# Patient Record
Sex: MALE | Race: ASIAN | NOT HISPANIC OR LATINO
[De-identification: names, ages, dates, MRNs, and addresses within clinical notes are randomized per-mention and may not be internally consistent; named-entity substitution may affect disease eponyms.]

---

## 2018-02-09 ENCOUNTER — APPOINTMENT (OUTPATIENT)
Dept: RADIOLOGY | Facility: HOSPITAL | Age: 68
End: 2018-02-09

## 2023-03-27 ENCOUNTER — INPATIENT (INPATIENT)
Facility: HOSPITAL | Age: 73
LOS: 3 days | Discharge: HOME | DRG: 372 | End: 2023-03-31
Attending: INTERNAL MEDICINE | Admitting: INTERNAL MEDICINE
Payer: MEDICARE

## 2023-03-27 VITALS
RESPIRATION RATE: 16 BRPM | OXYGEN SATURATION: 97 % | SYSTOLIC BLOOD PRESSURE: 122 MMHG | TEMPERATURE: 99 F | HEART RATE: 123 BPM | DIASTOLIC BLOOD PRESSURE: 86 MMHG

## 2023-03-27 DIAGNOSIS — I48.91 UNSPECIFIED ATRIAL FIBRILLATION: ICD-10-CM

## 2023-03-27 LAB
ALBUMIN SERPL ELPH-MCNC: 3.2 G/DL — LOW (ref 3.3–5)
ALP SERPL-CCNC: 73 U/L — SIGNIFICANT CHANGE UP (ref 40–120)
ALT FLD-CCNC: 46 U/L — HIGH (ref 10–45)
ANION GAP SERPL CALC-SCNC: 10 MMOL/L — SIGNIFICANT CHANGE UP (ref 5–17)
APTT BLD: 33.9 SEC — SIGNIFICANT CHANGE UP (ref 27.5–35.5)
AST SERPL-CCNC: 52 U/L — HIGH (ref 10–40)
BASOPHILS # BLD AUTO: 0.05 K/UL — SIGNIFICANT CHANGE UP (ref 0–0.2)
BASOPHILS NFR BLD AUTO: 0.8 % — SIGNIFICANT CHANGE UP (ref 0–2)
BILIRUB SERPL-MCNC: 1.4 MG/DL — HIGH (ref 0.2–1.2)
BUN SERPL-MCNC: 14 MG/DL — SIGNIFICANT CHANGE UP (ref 7–23)
CALCIUM SERPL-MCNC: 8.6 MG/DL — SIGNIFICANT CHANGE UP (ref 8.4–10.5)
CHLORIDE SERPL-SCNC: 101 MMOL/L — SIGNIFICANT CHANGE UP (ref 96–108)
CO2 SERPL-SCNC: 26 MMOL/L — SIGNIFICANT CHANGE UP (ref 22–31)
CREAT SERPL-MCNC: 0.96 MG/DL — SIGNIFICANT CHANGE UP (ref 0.5–1.3)
EGFR: 83 ML/MIN/1.73M2 — SIGNIFICANT CHANGE UP
EOSINOPHIL # BLD AUTO: 0.04 K/UL — SIGNIFICANT CHANGE UP (ref 0–0.5)
EOSINOPHIL NFR BLD AUTO: 0.6 % — SIGNIFICANT CHANGE UP (ref 0–6)
GLUCOSE SERPL-MCNC: 145 MG/DL — HIGH (ref 70–99)
HCT VFR BLD CALC: 41.9 % — SIGNIFICANT CHANGE UP (ref 39–50)
HGB BLD-MCNC: 13.8 G/DL — SIGNIFICANT CHANGE UP (ref 13–17)
IMM GRANULOCYTES NFR BLD AUTO: 0.3 % — SIGNIFICANT CHANGE UP (ref 0–0.9)
INR BLD: 1.67 RATIO — HIGH (ref 0.88–1.16)
LYMPHOCYTES # BLD AUTO: 1.54 K/UL — SIGNIFICANT CHANGE UP (ref 1–3.3)
LYMPHOCYTES # BLD AUTO: 23.7 % — SIGNIFICANT CHANGE UP (ref 13–44)
MCHC RBC-ENTMCNC: 31.9 PG — SIGNIFICANT CHANGE UP (ref 27–34)
MCHC RBC-ENTMCNC: 32.9 GM/DL — SIGNIFICANT CHANGE UP (ref 32–36)
MCV RBC AUTO: 96.8 FL — SIGNIFICANT CHANGE UP (ref 80–100)
MONOCYTES # BLD AUTO: 0.63 K/UL — SIGNIFICANT CHANGE UP (ref 0–0.9)
MONOCYTES NFR BLD AUTO: 9.7 % — SIGNIFICANT CHANGE UP (ref 2–14)
NEUTROPHILS # BLD AUTO: 4.21 K/UL — SIGNIFICANT CHANGE UP (ref 1.8–7.4)
NEUTROPHILS NFR BLD AUTO: 64.9 % — SIGNIFICANT CHANGE UP (ref 43–77)
NRBC # BLD: 0 /100 WBCS — SIGNIFICANT CHANGE UP (ref 0–0)
PLATELET # BLD AUTO: 245 K/UL — SIGNIFICANT CHANGE UP (ref 150–400)
POTASSIUM SERPL-MCNC: 4.2 MMOL/L — SIGNIFICANT CHANGE UP (ref 3.5–5.3)
POTASSIUM SERPL-SCNC: 4.2 MMOL/L — SIGNIFICANT CHANGE UP (ref 3.5–5.3)
PROT SERPL-MCNC: 6.5 G/DL — SIGNIFICANT CHANGE UP (ref 6–8.3)
PROTHROM AB SERPL-ACNC: 19.5 SEC — HIGH (ref 10.5–13.4)
RAPID RVP RESULT: SIGNIFICANT CHANGE UP
RBC # BLD: 4.33 M/UL — SIGNIFICANT CHANGE UP (ref 4.2–5.8)
RBC # BLD: 4.33 M/UL — SIGNIFICANT CHANGE UP (ref 4.2–5.8)
RBC # FLD: 12 % — SIGNIFICANT CHANGE UP (ref 10.3–14.5)
RETICS #: 46.8 K/UL — SIGNIFICANT CHANGE UP (ref 25–125)
RETICS/RBC NFR: 1.1 % — SIGNIFICANT CHANGE UP (ref 0.5–2.5)
SARS-COV-2 RNA SPEC QL NAA+PROBE: SIGNIFICANT CHANGE UP
SODIUM SERPL-SCNC: 137 MMOL/L — SIGNIFICANT CHANGE UP (ref 135–145)
WBC # BLD: 6.49 K/UL — SIGNIFICANT CHANGE UP (ref 3.8–10.5)
WBC # FLD AUTO: 6.49 K/UL — SIGNIFICANT CHANGE UP (ref 3.8–10.5)

## 2023-03-27 PROCEDURE — 71046 X-RAY EXAM CHEST 2 VIEWS: CPT | Mod: 26

## 2023-03-27 PROCEDURE — 99285 EMERGENCY DEPT VISIT HI MDM: CPT

## 2023-03-27 PROCEDURE — 74177 CT ABD & PELVIS W/CONTRAST: CPT | Mod: 26

## 2023-03-27 RX ORDER — SOTALOL HCL 120 MG
80 TABLET ORAL EVERY 24 HOURS
Refills: 0 | Status: DISCONTINUED | OUTPATIENT
Start: 2023-03-27 | End: 2023-03-30

## 2023-03-27 NOTE — ED ADULT NURSE NOTE - PATIENT/CAREGIVER ACCEPTED INTERPRETER SERVICES
yes Nsaids Counseling: NSAID Counseling: I discussed with the patient that NSAIDs should be taken with food. Prolonged use of NSAIDs can result in the development of stomach ulcers.  Patient advised to stop taking NSAIDs if abdominal pain occurs.  The patient verbalized understanding of the proper use and possible adverse effects of NSAIDs.  All of the patient's questions and concerns were addressed.

## 2023-03-27 NOTE — ED ADULT NURSE NOTE - OBJECTIVE STATEMENT
71y/o male bib triage, c/o diarrhea x 1 mth, also blood in stool x 2 wks, pt sts bright red blood in stool x 2 days, negative Nausea and vomiting. abdomen soft x 4 quadrants. negative sob, chest pain.

## 2023-03-27 NOTE — ED ADULT NURSE NOTE - NSSEPSISNEWALTERMENTAL_ED_A_ED
No Detail Level: Zone Recommendation Preamble: The following recommendations were made during visit: Cerave SA renewing cream or Amlactin applied 1-2x daily

## 2023-03-27 NOTE — ED ADULT TRIAGE NOTE - WILL THE PATIENT ACCEPT THE PFIZER COVID-19 VACCINE IF ELIGIBLE AND IT IS AVAILABLE?
Message from call center,  Patient spoke with Julia Bedoya yesterday and needs to be seen today regarding swelling in knees and feet No

## 2023-03-27 NOTE — H&P ADULT - ASSESSMENT
72 male h/o cad s/p pci, here with bloody diarrhea    bloody diarrhea  pancolitis on CT  gi consulted  check stool pcr    cad s/p pci  hold asa/plavix in setting of gi bleed  cards consulted    Advanced care planning was discussed with patient and family.  Advanced care planning forms were reviewed and discussed as appropriate.  Differential diagnosis and plan of care discussed with patient after the evaluation.   Pain assessed and judicious use of narcotics when appropriate was discussed.  Importance of Fall prevention discussed.  Counseling on Smoking and Alcohol cessation was offered when appropriate.  Counseling on Diet, exercise, and medication compliance was done.       Approx 60 minutes spent. 72 male h/o cad s/p pci, here with bloody diarrhea    bloody diarrhea  pancolitis on CT  gi consulted  check stool pcr    cad s/p pci  hold asa/plavix in setting of gi bleed  cards consulted    afib  hold AC in setting of bleed  rate control with sotalol      Advanced care planning was discussed with patient and family.  Advanced care planning forms were reviewed and discussed as appropriate.  Differential diagnosis and plan of care discussed with patient after the evaluation.   Pain assessed and judicious use of narcotics when appropriate was discussed.  Importance of Fall prevention discussed.  Counseling on Smoking and Alcohol cessation was offered when appropriate.  Counseling on Diet, exercise, and medication compliance was done.       Approx 60 minutes spent. 72 male h/o cad s/p pci, here with bloody diarrhea    bloody diarrhea  pancolitis on CT  gi consulted  check stool pcr  active type and screen  serial cbc    cad s/p pci  hold asa/plavix in setting of gi bleed  cards consulted    afib  hold AC in setting of bleed  rate control with sotalol      Advanced care planning was discussed with patient and family.  Advanced care planning forms were reviewed and discussed as appropriate.  Differential diagnosis and plan of care discussed with patient after the evaluation.   Pain assessed and judicious use of narcotics when appropriate was discussed.  Importance of Fall prevention discussed.  Counseling on Smoking and Alcohol cessation was offered when appropriate.  Counseling on Diet, exercise, and medication compliance was done.       Approx 60 minutes spent.

## 2023-03-27 NOTE — H&P ADULT - NSHPPHYSICALEXAM_GEN_ALL_CORE
Vital Signs Last 24 Hrs  T(C): 36.8 (27 Mar 2023 16:46), Max: 37 (27 Mar 2023 12:53)  T(F): 98.2 (27 Mar 2023 16:46), Max: 98.6 (27 Mar 2023 12:53)  HR: 56 (27 Mar 2023 16:46) (56 - 141)  BP: 130/61 (27 Mar 2023 16:46) (100/64 - 130/61)  BP(mean): 72 (27 Mar 2023 14:16) (72 - 73)  RR: 16 (27 Mar 2023 16:46) (16 - 19)  SpO2: 100% (27 Mar 2023 16:46) (97% - 100%)    Parameters below as of 27 Mar 2023 14:16  Patient On (Oxygen Delivery Method): room air    PHYSICAL EXAM:  GENERAL: NAD, well-developed  HEAD:  Atraumatic, Normocephalic  EYES: EOMI, PERRLA, conjunctiva and sclera clear  NECK: Supple, No JVD  CHEST/LUNG: Clear to auscultation bilaterally; No wheeze  HEART: Regular rate and rhythm; No murmurs, rubs, or gallops  ABDOMEN: Soft, Nontender, Nondistended; Bowel sounds present  EXTREMITIES:  2+ Peripheral Pulses, No clubbing, cyanosis, or edema  PSYCH: AAOx3  NEUROLOGY: non-focal  SKIN: No rashes or lesions

## 2023-03-27 NOTE — H&P ADULT - HISTORY OF PRESENT ILLNESS
73 yo male h/o  cad s/p pci, here with c/o diarrhea x4 weeks, which has been bloody past 2 weeks.  no n/v  no chest pain  or sob     73 yo male h/o afib on xarelto,  cad s/p pci, here with c/o diarrhea x4 weeks, which has been bloody past 2 weeks.  no n/v  no chest pain  or sob

## 2023-03-28 DIAGNOSIS — I25.10 ATHEROSCLEROTIC HEART DISEASE OF NATIVE CORONARY ARTERY WITHOUT ANGINA PECTORIS: ICD-10-CM

## 2023-03-28 DIAGNOSIS — K92.2 GASTROINTESTINAL HEMORRHAGE, UNSPECIFIED: ICD-10-CM

## 2023-03-28 DIAGNOSIS — I48.91 UNSPECIFIED ATRIAL FIBRILLATION: ICD-10-CM

## 2023-03-28 LAB
ANION GAP SERPL CALC-SCNC: 10 MMOL/L — SIGNIFICANT CHANGE UP (ref 5–17)
BUN SERPL-MCNC: 12 MG/DL — SIGNIFICANT CHANGE UP (ref 7–23)
CALCIUM SERPL-MCNC: 8.3 MG/DL — LOW (ref 8.4–10.5)
CHLORIDE SERPL-SCNC: 103 MMOL/L — SIGNIFICANT CHANGE UP (ref 96–108)
CO2 SERPL-SCNC: 25 MMOL/L — SIGNIFICANT CHANGE UP (ref 22–31)
CREAT SERPL-MCNC: 0.96 MG/DL — SIGNIFICANT CHANGE UP (ref 0.5–1.3)
CRP SERPL-MCNC: 23 MG/L — HIGH (ref 0–4)
EGFR: 83 ML/MIN/1.73M2 — SIGNIFICANT CHANGE UP
ERYTHROCYTE [SEDIMENTATION RATE] IN BLOOD: 8 MM/HR — SIGNIFICANT CHANGE UP (ref 0–20)
GLUCOSE SERPL-MCNC: 102 MG/DL — HIGH (ref 70–99)
HCT VFR BLD CALC: 35.1 % — LOW (ref 39–50)
HCV AB S/CO SERPL IA: 0.09 S/CO — SIGNIFICANT CHANGE UP (ref 0–0.99)
HCV AB SERPL-IMP: SIGNIFICANT CHANGE UP
HGB BLD-MCNC: 11.8 G/DL — LOW (ref 13–17)
MCHC RBC-ENTMCNC: 32.6 PG — SIGNIFICANT CHANGE UP (ref 27–34)
MCHC RBC-ENTMCNC: 33.6 GM/DL — SIGNIFICANT CHANGE UP (ref 32–36)
MCV RBC AUTO: 97 FL — SIGNIFICANT CHANGE UP (ref 80–100)
NRBC # BLD: 0 /100 WBCS — SIGNIFICANT CHANGE UP (ref 0–0)
PLATELET # BLD AUTO: 191 K/UL — SIGNIFICANT CHANGE UP (ref 150–400)
POTASSIUM SERPL-MCNC: 3.9 MMOL/L — SIGNIFICANT CHANGE UP (ref 3.5–5.3)
POTASSIUM SERPL-SCNC: 3.9 MMOL/L — SIGNIFICANT CHANGE UP (ref 3.5–5.3)
RBC # BLD: 3.62 M/UL — LOW (ref 4.2–5.8)
RBC # FLD: 12.1 % — SIGNIFICANT CHANGE UP (ref 10.3–14.5)
SODIUM SERPL-SCNC: 138 MMOL/L — SIGNIFICANT CHANGE UP (ref 135–145)
WBC # BLD: 5.12 K/UL — SIGNIFICANT CHANGE UP (ref 3.8–10.5)
WBC # FLD AUTO: 5.12 K/UL — SIGNIFICANT CHANGE UP (ref 3.8–10.5)

## 2023-03-28 RX ORDER — CIPROFLOXACIN LACTATE 400MG/40ML
VIAL (ML) INTRAVENOUS
Refills: 0 | Status: DISCONTINUED | OUTPATIENT
Start: 2023-03-28 | End: 2023-03-28

## 2023-03-28 RX ORDER — METRONIDAZOLE 500 MG
TABLET ORAL
Refills: 0 | Status: DISCONTINUED | OUTPATIENT
Start: 2023-03-28 | End: 2023-03-30

## 2023-03-28 RX ORDER — METRONIDAZOLE 500 MG
500 TABLET ORAL EVERY 8 HOURS
Refills: 0 | Status: DISCONTINUED | OUTPATIENT
Start: 2023-03-28 | End: 2023-03-30

## 2023-03-28 RX ORDER — METRONIDAZOLE 500 MG
500 TABLET ORAL ONCE
Refills: 0 | Status: COMPLETED | OUTPATIENT
Start: 2023-03-28 | End: 2023-03-28

## 2023-03-28 RX ADMIN — Medication 100 MILLIGRAM(S): at 12:45

## 2023-03-28 RX ADMIN — Medication 100 MILLIGRAM(S): at 22:11

## 2023-03-28 RX ADMIN — Medication 80 MILLIGRAM(S): at 06:01

## 2023-03-28 NOTE — CONSULT NOTE ADULT - ASSESSMENT
diarrhea   colitis    CT a/p with pancolitis  check GI PCR  start cipro/flagyl   CLD  advance diet as tolerated  will need colonoscopy in 6-8 weeks   d/w pt in ER    I reviewed the overnight course of events on the unit, re-confirming the patient history. I discussed the care with the patient and their family  The plan of care was discussed with the physician assistant and modifications were made to the notation where appropriate.   Differential diagnosis and plan of care discussed with patient after the evaluation  35 minutes spent on total encounter of which more than fifty percent of the encounter was spent counseling and/or coordinating care by the attending physician.  Advanced care planning was discussed with patient and family.  Advanced care planning forms were reviewed and discussed.  Risks, benefits and alternatives of gastroenterologic procedures were discussed in detail and all questions were answered.    diarrhea   colitis    CT a/p with pancolitis  check GI PCR  start cipro/flagyl   CLD  advance diet as tolerated  check ESR, CRP and fecal calprotectin   will need colonoscopy in 6-8 weeks   d/w pt in ER    I reviewed the overnight course of events on the unit, re-confirming the patient history. I discussed the care with the patient and their family  The plan of care was discussed with the physician assistant and modifications were made to the notation where appropriate.   Differential diagnosis and plan of care discussed with patient after the evaluation  35 minutes spent on total encounter of which more than fifty percent of the encounter was spent counseling and/or coordinating care by the attending physician.  Advanced care planning was discussed with patient and family.  Advanced care planning forms were reviewed and discussed.  Risks, benefits and alternatives of gastroenterologic procedures were discussed in detail and all questions were answered.

## 2023-03-28 NOTE — CONSULT NOTE ADULT - ASSESSMENT
73 yo male h/o afib on xarelto,  cad s/p pci, here with c/o diarrhea x4 weeks, which has been bloody past 2 weeks.

## 2023-03-28 NOTE — CONSULT NOTE ADULT - NSCONSULTADDITIONALINFOA_GEN_ALL_CORE
Patient ans sister received discharge instructions. No questions and concerns. Pt discharge in stable condition by wheelchair   
Patient brought a picture of home covid antigen test on phone as directed.  I personally saw this result and it was time stamped 7/5/22 at 1247.  Result was negative.  
sub acute diarrhea  ct showing pan colitis  unclear if this is infectious vs inflammatory  check GI pcr  check c diff  check fecal calprotectin  check asca/anca  check esr/crp  start cipro/flagyl if no ekg abnormalities

## 2023-03-28 NOTE — CONSULT NOTE ADULT - SUBJECTIVE AND OBJECTIVE BOX
[facility]  Myke Lau PA-C  [address] [place] [id number] [phone]      Chief Complaint:  Patient is a 72y old  Male who presents with a chief complaint of     HPI: 73 yo male h/o afib on xarelto,  cad s/p pci, here with c/o diarrhea x4 weeks, which has been bloody past 2 weeks.  no n/v  no chest pain  or sob    GI asked to consult for pancolitis seen on CT. Pt seen and examined in the ER using  [id number]. Pt reports diarrhea x 4 weeks. Seen his PMD and was given some medication, unsure what it was. Now reporting blood in stool for past few days. Reports his last colonoscopy years ago was normal. Denies abdominal pain, nausea ro vomiting.       Allergies:  No Known Allergies      Medications:  sotalol. 80 milliGRAM(s) Oral every 24 hours      PMHX/PSHX:  CAD (coronary artery disease)        Family history:      Social History:     ROS:     General:  No wt loss, fevers, chills, night sweats, fatigue,   Eyes:  Good vision, no reported pain  ENT:  No sore throat, pain, runny nose, dysphagia  CV:  No pain, palpitations, hypo/hypertension  Resp:  No dyspnea, cough, tachypnea, wheezing  GI:  No pain, No nausea, No vomiting, + diarrhea, No constipation, No weight loss, No fever, No pruritis, No rectal bleeding, No tarry stools, No dysphagia,  :  No pain, bleeding, incontinence, nocturia  Muscle:  No pain, weakness  Neuro:  No weakness, tingling, memory problems  Psych:  No fatigue, insomnia, mood problems, depression  Endocrine:  No polyuria, polydipsia, cold/heat intolerance  Heme:  No petechiae, ecchymosis, easy bruisability  Skin:  No rash, tattoos, scars, edema      PHYSICAL EXAM:   Vital Signs:  Vital Signs Last 24 Hrs  T(C): 36.5 (28 Mar 2023 05:42), Max: 37 (27 Mar 2023 12:53)  T(F): 97.7 (28 Mar 2023 05:42), Max: 98.6 (27 Mar 2023 12:53)  HR: 63 (28 Mar 2023 05:42) (56 - 141)  BP: 112/64 (28 Mar 2023 05:42) (100/64 - 130/61)  BP(mean): 72 (27 Mar 2023 14:16) (72 - 73)  RR: 18 (28 Mar 2023 05:42) (16 - 19)  SpO2: 99% (28 Mar 2023 05:42) (97% - 100%)    Parameters below as of 28 Mar 2023 05:42  Patient On (Oxygen Delivery Method): room air      Daily Height in cm: 175.26 (27 Mar 2023 12:55)    Daily     GENERAL:  Appears stated age,   HEENT:  NC/AT,    CHEST:  Full & symmetric excursion,   HEART:  Regular rhythm  ABDOMEN:  Soft, non-tender, non-distended,   EXTEREMITIES:  no cyanosis,clubbing or edema  SKIN:  No rash  NEURO:  Alert,    LABS:                        11.8   5.12  )-----------( 191      ( 28 Mar 2023 06:51 )             35.1     03-28    138  |  103  |  12  ----------------------------<  102<H>  3.9   |  25  |  0.96    Ca    8.3<L>      28 Mar 2023 06:51    TPro  6.5  /  Alb  3.2<L>  /  TBili  1.4<H>  /  DBili  x   /  AST  52<H>  /  ALT  46<H>  /  AlkPhos  73  03-27    LIVER FUNCTIONS - ( 27 Mar 2023 13:21 )  Alb: 3.2 g/dL / Pro: 6.5 g/dL / ALK PHOS: 73 U/L / ALT: 46 U/L / AST: 52 U/L / GGT: x           PT/INR - ( 27 Mar 2023 13:21 )   PT: 19.5 sec;   INR: 1.67 ratio         PTT - ( 27 Mar 2023 13:21 )  PTT:33.9 sec        Imaging:    < from: CT Abdomen and Pelvis w/ IV Cont (03.27.23 @ 14:36) >    ACC: [id number] EXAM:  CT ABDOMEN AND PELVIS IC   ORDERED BY: OLIVER KELSEY     PROCEDURE DATE:  03/27/2023          INTERPRETATION:  CLINICAL INFORMATION: Diarrhea, bloody stool, concern   for gastrointestinal hemorrhage.    COMPARISON: None.    CONTRAST/COMPLICATIONS:  IV Contrast: Omnipaque 350  90 cc administered   10 cc discarded  Oral Contrast: NONE  Complications: None reported at time of study completion    PROCEDURE:  CT of the Abdomen and Pelvis was performed.  Precontrast, Arterial and Delayed phases were performed.  Sagittal and coronal reformats were performed.    FINDINGS:  LOWER CHEST: Within normal limits.    LIVER: Within normal limits.  BILE DUCTS: Normal caliber.  GALLBLADDER: Within normal limits.  SPLEEN: Within normal limits.  PANCREAS: Within normal limits.  ADRENALS: Within normal limits.  KIDNEYS/URETERS: Bilateral renal cortical scarring. A few subcentimeter   hypodense renal foci bilaterally that are too small to characterize. No   hydronephrosis. No renal calculi.    BLADDER: Underdistended.  REPRODUCTIVE ORGANS: Prostate within normal limits.    BOWEL: Diffuse colonic wall thickening spanning the cecum through to the   rectum with pericolonic fat infiltration and engorgement of the adjacent   mesenteric vessels, suggestive of pancolitis/proctitis. Prominent vessels   in the low rectum, which may represent hemorrhoids. No evidence of active   gastrointestinal hemorrhage at this time. No bowel obstruction. Appendix   is normal.  PERITONEUM: Small volume of free abdominopelvic fluid. No   pneumoperitoneum. No organized collection.  VESSELS: Atherosclerotic changes.  RETROPERITONEUM/LYMPH NODES: No lymphadenopathy.  ABDOMINAL WALL: Within normal limits.  BONES: Degenerative changes. Nonspecific sclerotic focus in the L2   vertebral body, which may represent a bone island. Multilevel bridging   osteophytes/syndesmophytes of the thoracolumbar spine.    IMPRESSION:  Findings suggestive of pancolitis/proctitis.    Prominent vessels along the lowrectum, which may represent hemorrhoids.   No evidence of active gastrointestinal hemorrhage at this time.    Small volume of free abdominopelvic fluid.          
CHIEF COMPLAINT:  Bloody Stools    HISTORY OF PRESENT ILLNESS:  71 yo male h/o afib on xarelto, cad s/p pci, here with c/o diarrhea x4 weeks, which has been bloody past 2 weeks.    no n/v  no chest pain or sob    Cardiology consulted for cardiac management.  Pt was able to communicate in English.  Denied chest pain, SOB, palpitations.     PAST MEDICAL & SURGICAL HISTORY:  CAD (coronary artery disease)    MEDICATIONS:  sotalol. 80 milliGRAM(s) Oral every 24 hours    FAMILY HISTORY:    SOCIAL HISTORY:    [ ] Non-smoker  [ ] Smoker  [ ] Alcohol    Allergies    No Known Allergies    Intolerances    REVIEW OF SYSTEMS:  CONSTITUTIONAL: No fever, weight loss, + fatigue  EYES: No eye pain, visual disturbances, or discharge  ENMT:  No difficulty hearing, tinnitus, vertigo; No sinus or throat pain  NECK: No pain or stiffness  RESPIRATORY: No cough, wheezing, chills or hemoptysis; No Shortness of Breath  CARDIOVASCULAR: No chest pain, palpitations, passing out, dizziness, or leg swelling  GASTROINTESTINAL: + abdominal or epigastric pain. No nausea, vomiting, or hematemesis; No diarrhea or constipation. No melena or hematochezia.  GENITOURINARY: No dysuria, frequency, hematuria, or incontinence  NEUROLOGICAL: No headaches, memory loss, loss of strength, numbness, or tremors  SKIN: No itching, burning, rashes, or lesions   LYMPH Nodes: No enlarged glands  ENDOCRINE: No heat or cold intolerance; No hair loss  MUSCULOSKELETAL: No joint pain or swelling; No muscle, back, or extremity pain  PSYCHIATRIC: No depression, anxiety, mood swings, or difficulty sleeping  HEME/LYMPH: No easy bruising, or bleeding gums  ALLERY AND IMMUNOLOGIC: No hives or eczema	    [ ] All others negative	  [ ] Unable to obtain    PHYSICAL EXAM:  T(C): 36.5 (03-28-23 @ 05:42), Max: 37 (03-27-23 @ 12:53)  HR: 63 (03-28-23 @ 05:42) (56 - 141)  BP: 112/64 (03-28-23 @ 05:42) (100/64 - 130/61)  RR: 18 (03-28-23 @ 05:42) (16 - 19)  SpO2: 99% (03-28-23 @ 05:42) (97% - 100%)  Wt(kg): --  I&O's Summary    Appearance: Normal	  HEENT: Normal oral mucosa, PERRL, EOMI	  Lymphatic: No lymphadenopathy  Cardiovascular: Irregular S1 S2, No JVD, No murmurs, No edema  Respiratory: Lungs clear to auscultation	  Psychiatry: A & O x 3, Mood & affect appropriate  Gastrointestinal:  Soft, Non-tender, + BS	  Skin: No rashes, No ecchymoses, No cyanosis	  Neurologic: Non-focal  Extremities: Normal range of motion, No clubbing, cyanosis or edema  Vascular: Peripheral pulses palpable 2+ bilaterally    TELEMETRY: 	    ECG:  Aflutter - no acute ischemic STT changes 	  RADIOLOGY: < from: CT Abdomen and Pelvis w/ IV Cont (03.27.23 @ 14:36) >    ACC: [id number] EXAM:  CT ABDOMEN AND PELVIS IC   ORDERED BY: OLIVER KELSEY     PROCEDURE DATE:  03/27/2023          INTERPRETATION:  CLINICAL INFORMATION: Diarrhea, bloody stool, concern   for gastrointestinal hemorrhage.    COMPARISON: None.    CONTRAST/COMPLICATIONS:  IV Contrast: Omnipaque 350  90 cc administered   10 cc discarded  Oral Contrast: NONE  Complications: None reported at time of study completion    PROCEDURE:  CT of the Abdomen and Pelvis was performed.  Precontrast, Arterial and Delayed phases were performed.  Sagittal and coronal reformats were performed.    FINDINGS:  LOWER CHEST: Within normal limits.    LIVER: Within normal limits.  BILE DUCTS: Normal caliber.  GALLBLADDER: Within normal limits.  SPLEEN: Within normal limits.  PANCREAS: Within normal limits.  ADRENALS: Within normal limits.  KIDNEYS/URETERS: Bilateral renal cortical scarring. A few subcentimeter   hypodense renal foci bilaterally that are too small to characterize. No   hydronephrosis. No renal calculi.    BLADDER: Underdistended.  REPRODUCTIVE ORGANS: Prostate within normal limits.    BOWEL: Diffuse colonic wall thickening spanning the cecum through to the   rectum with pericolonic fat infiltration and engorgement of the adjacent   mesenteric vessels, suggestive of pancolitis/proctitis. Prominent vessels   in the low rectum, which may represent hemorrhoids. No evidence of active   gastrointestinal hemorrhage at this time. No bowel obstruction. Appendix   is normal.  PERITONEUM: Small volume of free abdominopelvic fluid. No   pneumoperitoneum. No organized collection.  VESSELS: Atherosclerotic changes.  RETROPERITONEUM/LYMPH NODES: No lymphadenopathy.  ABDOMINAL WALL: Within normal limits.  BONES: Degenerative changes. Nonspecific sclerotic focus in the L2   vertebral body, which may represent a bone island. Multilevel bridging   osteophytes/syndesmophytes of the thoracolumbar spine.    IMPRESSION:  Findings suggestive of pancolitis/proctitis.    Prominent vessels along the lowrectum, which may represent hemorrhoids.   No evidence of active gastrointestinal hemorrhage at this time.    Small volume of free abdominopelvic fluid.        --- End of Report ---    < end of copied text >  < from: Xray Chest 2 Views PA/Lat (03.27.23 @ 13:44) >  ACC: [id number] EXAM:  XR CHEST PA LAT 2V   ORDERED BY: BLACK CHAVEZ     PROCEDURE DATE:  03/27/2023          INTERPRETATION:  CLINICAL INDICATION: CHEST PAIN    TECHNIQUE: 2 views; Frontal and lateral views of the chest were obtained.    COMPARISON:None.    FINDINGS:  The heart size is normal  The lungs are clear.  There is no pneumothorax or pleural effusion.    IMPRESSION:  Clear lungs.    --- End of Report ---    < end of copied text >    OTHER: 	  	  LABS:	 	    CARDIAC MARKERS:                        11.8   5.12  )-----------( 191      ( 28 Mar 2023 06:51 )             35.1     03-28    138  |  103  |  12  ----------------------------<  102<H>  3.9   |  25  |  0.96    Ca    8.3<L>      28 Mar 2023 06:51    TPro  6.5  /  Alb  3.2<L>  /  TBili  1.4<H>  /  DBili  x   /  AST  52<H>  /  ALT  46<H>  /  AlkPhos  73  03-27    proBNP:   Lipid Profile:   HgA1c:   TSH:

## 2023-03-29 LAB
ANION GAP SERPL CALC-SCNC: 11 MMOL/L — SIGNIFICANT CHANGE UP (ref 5–17)
AUTO DIFF PNL BLD: NEGATIVE — SIGNIFICANT CHANGE UP
BAKER'S YEAST IGA QN IA: 6.8 UNITS — SIGNIFICANT CHANGE UP
BAKER'S YEAST IGA QN IA: NEGATIVE — SIGNIFICANT CHANGE UP
BAKER'S YEAST IGG QN IA: 13.7 UNITS — SIGNIFICANT CHANGE UP
BAKER'S YEAST IGG QN IA: NEGATIVE — SIGNIFICANT CHANGE UP
BUN SERPL-MCNC: 10 MG/DL — SIGNIFICANT CHANGE UP (ref 7–23)
C DIFF GDH STL QL: SIGNIFICANT CHANGE UP
C DIFF GDH STL QL: SIGNIFICANT CHANGE UP
C-ANCA SER-ACNC: NEGATIVE — SIGNIFICANT CHANGE UP
CALCIUM SERPL-MCNC: 8.2 MG/DL — LOW (ref 8.4–10.5)
CHLORIDE SERPL-SCNC: 103 MMOL/L — SIGNIFICANT CHANGE UP (ref 96–108)
CO2 SERPL-SCNC: 25 MMOL/L — SIGNIFICANT CHANGE UP (ref 22–31)
CREAT SERPL-MCNC: 1.18 MG/DL — SIGNIFICANT CHANGE UP (ref 0.5–1.3)
CRP SERPL-MCNC: 17 MG/L — HIGH (ref 0–4)
EGFR: 65 ML/MIN/1.73M2 — SIGNIFICANT CHANGE UP
GI PCR PANEL: SIGNIFICANT CHANGE UP
GLUCOSE SERPL-MCNC: 189 MG/DL — HIGH (ref 70–99)
HCT VFR BLD CALC: 37.6 % — LOW (ref 39–50)
HGB BLD-MCNC: 12.4 G/DL — LOW (ref 13–17)
MCHC RBC-ENTMCNC: 32 PG — SIGNIFICANT CHANGE UP (ref 27–34)
MCHC RBC-ENTMCNC: 33 GM/DL — SIGNIFICANT CHANGE UP (ref 32–36)
MCV RBC AUTO: 97.2 FL — SIGNIFICANT CHANGE UP (ref 80–100)
NRBC # BLD: 0 /100 WBCS — SIGNIFICANT CHANGE UP (ref 0–0)
P-ANCA SER-ACNC: NEGATIVE — SIGNIFICANT CHANGE UP
PLATELET # BLD AUTO: 207 K/UL — SIGNIFICANT CHANGE UP (ref 150–400)
POTASSIUM SERPL-MCNC: 3.8 MMOL/L — SIGNIFICANT CHANGE UP (ref 3.5–5.3)
POTASSIUM SERPL-SCNC: 3.8 MMOL/L — SIGNIFICANT CHANGE UP (ref 3.5–5.3)
RBC # BLD: 3.87 M/UL — LOW (ref 4.2–5.8)
RBC # FLD: 12.1 % — SIGNIFICANT CHANGE UP (ref 10.3–14.5)
SODIUM SERPL-SCNC: 139 MMOL/L — SIGNIFICANT CHANGE UP (ref 135–145)
WBC # BLD: 4.5 K/UL — SIGNIFICANT CHANGE UP (ref 3.8–10.5)
WBC # FLD AUTO: 4.5 K/UL — SIGNIFICANT CHANGE UP (ref 3.8–10.5)

## 2023-03-29 RX ORDER — VANCOMYCIN HCL 1 G
125 VIAL (EA) INTRAVENOUS EVERY 6 HOURS
Refills: 0 | Status: DISCONTINUED | OUTPATIENT
Start: 2023-03-29 | End: 2023-03-31

## 2023-03-29 RX ADMIN — Medication 80 MILLIGRAM(S): at 06:30

## 2023-03-29 RX ADMIN — Medication 100 MILLIGRAM(S): at 06:30

## 2023-03-29 RX ADMIN — Medication 100 MILLIGRAM(S): at 21:49

## 2023-03-29 RX ADMIN — Medication 100 MILLIGRAM(S): at 13:34

## 2023-03-29 NOTE — PATIENT PROFILE ADULT - FALL HARM RISK - UNIVERSAL INTERVENTIONS
Bed in lowest position, wheels locked, appropriate side rails in place/Call bell, personal items and telephone in reach/Instruct patient to call for assistance before getting out of bed or chair/Non-slip footwear when patient is out of bed/[place] to call system/Physically safe environment - no spills, clutter or unnecessary equipment/Purposeful Proactive Rounding/Room/bathroom lighting operational, light cord in reach

## 2023-03-29 NOTE — PATIENT PROFILE ADULT - SURGICAL SITE INCISION
[Fall prevention counseling provided] : Fall prevention counseling provided [Adequate lighting] : Adequate lighting [No throw rugs] : No throw rugs [Use proper foot wear] : Use proper foot wear [Use recommended devices] : Use recommended devices no [Behavioral health counseling provided] : Behavioral health counseling provided [Sleep ___ hours/day] : Sleep [unfilled] hours/day [Engage in a relaxing activity] : Engage in a relaxing activity [Plan in advance] : Plan in advance [None] : None [Good understanding] : Patient has a good understanding of lifestyle changes and steps needed to achieve self management goal [de-identified] :  low chol diet. Avoid fried foods, red meat, butter, eggs, hard cheeses. Use canola or olive oil preferred.\par \par  - Encouraged a low fat/low cholesterol diet\par  - Discussed Healthy eating, avoidance of concentrated sweets, and to include vegetables by at least 3 meals a day\par  - encouraged low glycemic fruits, grains and vegetables and a diet high in plant protein\par  - Discussed regular exercise\par  - Discussed importance of follow up physician visits\par \par Symptomatic patients : Test for influenza, if positive, treat for influenza and do not continue below. \par 1. Fever plus cough or shortness of breath : Test for RVP and COVID-19.\par 2.Indirect, circumstantial or unclear exposure to COVID-19, or other concerning cases not meeting above criteria: Please call [facility] to discuss testing. \par +++ All above cases must be reported to the [facility] registry. +++\par \par Asymptomatic patients: \par 1. Known first-degree direct-contact exposure to positive COVID-19 patient but asymptomatic: No testing PLUS 14 day self-quarantine. Pt to call if symptoms develop. Report to [facility] Registry.\par 2. No known exposure and asymptomatic, referred from outside healthcare organization: Please call [facility] to discuss testing. \par 3.All other asymptomatic patients with no known exposures: no testing, no exceptions.\par \par Bp stable, continue with medications, dash diet, exercise, and dietary management.Continue to check home Bp’s.\par \par Patient's BUN was mildly elevated patient was made aware that he needs to increase fluid intake=

## 2023-03-29 NOTE — PATIENT PROFILE ADULT - NSTRANSFERBELONGINGSDISPO_GEN_A_NUR
with patient Otezla Counseling: The side effects of Otezla were discussed with the patient, including but not limited to worsening or new depression, weight loss, diarrhea, nausea, upper respiratory tract infection, and headache. Patient instructed to call the office should any adverse effect occur.  The patient verbalized understanding of the proper use and possible adverse effects of Otezla.  All the patient's questions and concerns were addressed.

## 2023-03-30 LAB
ALBUMIN SERPL ELPH-MCNC: 2.8 G/DL — LOW (ref 3.3–5)
ALP SERPL-CCNC: 71 U/L — SIGNIFICANT CHANGE UP (ref 40–120)
ALT FLD-CCNC: 39 U/L — SIGNIFICANT CHANGE UP (ref 10–45)
ANION GAP SERPL CALC-SCNC: 13 MMOL/L — SIGNIFICANT CHANGE UP (ref 5–17)
AST SERPL-CCNC: 54 U/L — HIGH (ref 10–40)
BASOPHILS # BLD AUTO: 0.05 K/UL — SIGNIFICANT CHANGE UP (ref 0–0.2)
BASOPHILS NFR BLD AUTO: 0.8 % — SIGNIFICANT CHANGE UP (ref 0–2)
BILIRUB SERPL-MCNC: 1 MG/DL — SIGNIFICANT CHANGE UP (ref 0.2–1.2)
BUN SERPL-MCNC: 10 MG/DL — SIGNIFICANT CHANGE UP (ref 7–23)
CALCIUM SERPL-MCNC: 8.4 MG/DL — SIGNIFICANT CHANGE UP (ref 8.4–10.5)
CHLORIDE SERPL-SCNC: 104 MMOL/L — SIGNIFICANT CHANGE UP (ref 96–108)
CO2 SERPL-SCNC: 23 MMOL/L — SIGNIFICANT CHANGE UP (ref 22–31)
CREAT SERPL-MCNC: 1.02 MG/DL — SIGNIFICANT CHANGE UP (ref 0.5–1.3)
EGFR: 77 ML/MIN/1.73M2 — SIGNIFICANT CHANGE UP
EOSINOPHIL # BLD AUTO: 0.06 K/UL — SIGNIFICANT CHANGE UP (ref 0–0.5)
EOSINOPHIL NFR BLD AUTO: 1 % — SIGNIFICANT CHANGE UP (ref 0–6)
ERYTHROCYTE [SEDIMENTATION RATE] IN BLOOD: 15 MM/HR — SIGNIFICANT CHANGE UP (ref 0–20)
GLUCOSE SERPL-MCNC: 97 MG/DL — SIGNIFICANT CHANGE UP (ref 70–99)
HCT VFR BLD CALC: 40.4 % — SIGNIFICANT CHANGE UP (ref 39–50)
HGB BLD-MCNC: 13.5 G/DL — SIGNIFICANT CHANGE UP (ref 13–17)
IMM GRANULOCYTES NFR BLD AUTO: 0.3 % — SIGNIFICANT CHANGE UP (ref 0–0.9)
LYMPHOCYTES # BLD AUTO: 2.45 K/UL — SIGNIFICANT CHANGE UP (ref 1–3.3)
LYMPHOCYTES # BLD AUTO: 39.3 % — SIGNIFICANT CHANGE UP (ref 13–44)
MCHC RBC-ENTMCNC: 32.4 PG — SIGNIFICANT CHANGE UP (ref 27–34)
MCHC RBC-ENTMCNC: 33.4 GM/DL — SIGNIFICANT CHANGE UP (ref 32–36)
MCV RBC AUTO: 96.9 FL — SIGNIFICANT CHANGE UP (ref 80–100)
MONOCYTES # BLD AUTO: 0.55 K/UL — SIGNIFICANT CHANGE UP (ref 0–0.9)
MONOCYTES NFR BLD AUTO: 8.8 % — SIGNIFICANT CHANGE UP (ref 2–14)
NEUTROPHILS # BLD AUTO: 3.1 K/UL — SIGNIFICANT CHANGE UP (ref 1.8–7.4)
NEUTROPHILS NFR BLD AUTO: 49.8 % — SIGNIFICANT CHANGE UP (ref 43–77)
NRBC # BLD: 0 /100 WBCS — SIGNIFICANT CHANGE UP (ref 0–0)
PLATELET # BLD AUTO: 213 K/UL — SIGNIFICANT CHANGE UP (ref 150–400)
POTASSIUM SERPL-MCNC: 3.7 MMOL/L — SIGNIFICANT CHANGE UP (ref 3.5–5.3)
POTASSIUM SERPL-SCNC: 3.7 MMOL/L — SIGNIFICANT CHANGE UP (ref 3.5–5.3)
PROT SERPL-MCNC: 5.9 G/DL — LOW (ref 6–8.3)
RBC # BLD: 4.17 M/UL — LOW (ref 4.2–5.8)
RBC # FLD: 12.2 % — SIGNIFICANT CHANGE UP (ref 10.3–14.5)
SODIUM SERPL-SCNC: 140 MMOL/L — SIGNIFICANT CHANGE UP (ref 135–145)
WBC # BLD: 6.23 K/UL — SIGNIFICANT CHANGE UP (ref 3.8–10.5)
WBC # FLD AUTO: 6.23 K/UL — SIGNIFICANT CHANGE UP (ref 3.8–10.5)

## 2023-03-30 PROCEDURE — 93010 ELECTROCARDIOGRAM REPORT: CPT

## 2023-03-30 RX ORDER — CHLORHEXIDINE GLUCONATE 213 G/1000ML
1 SOLUTION TOPICAL
Refills: 0 | Status: DISCONTINUED | OUTPATIENT
Start: 2023-03-30 | End: 2023-03-31

## 2023-03-30 RX ORDER — METOPROLOL TARTRATE 50 MG
25 TABLET ORAL EVERY 8 HOURS
Refills: 0 | Status: DISCONTINUED | OUTPATIENT
Start: 2023-03-31 | End: 2023-03-31

## 2023-03-30 RX ADMIN — Medication 125 MILLIGRAM(S): at 06:22

## 2023-03-30 RX ADMIN — Medication 125 MILLIGRAM(S): at 17:08

## 2023-03-30 RX ADMIN — Medication 80 MILLIGRAM(S): at 09:13

## 2023-03-30 RX ADMIN — Medication 125 MILLIGRAM(S): at 23:01

## 2023-03-30 RX ADMIN — Medication 125 MILLIGRAM(S): at 11:33

## 2023-03-30 RX ADMIN — Medication 100 MILLIGRAM(S): at 06:16

## 2023-03-30 RX ADMIN — Medication 125 MILLIGRAM(S): at 00:26

## 2023-03-30 NOTE — CHART NOTE - NSCHARTNOTEFT_GEN_A_CORE
Mr. Coronado has history of Afib, holding AC due to GI bleed. On home sotalol.      >> 503 >> 456. Last , EKG this . Am Sotalol given.     Contacted Dr. Burgess re: above as QTC on 3/29 was > 500. As per Dr. Burgess, sotalol dose no longer controlling HR, switch to metoprolol 25 mg Q 8 hours. Will start tomorrow as patient received sotalol dose this AM.   RN aware of updated plan.   Dr. Oneil aware.

## 2023-03-30 NOTE — PROVIDER CONTACT NOTE (OTHER) - DATE AND TIME:
1500  Briefed by Lian Singh [facility], on pt presentation and plan of care.  No beds available other than [facility] and referral has been sent.  I attempted to contacted [facility] several times to check the status of the referral.  Each time, I was sent to intake where the phone rang and rang with no answer.    1600  Contacted [facility] and asked to speak with Intake.  Was told they were on the other line.  Provided my name and contact number for return call regarding status of referral.    1700  No word from [facility].  Called and spoke to  who reports they no longer have beds.  There are no beds available in the state.  As discussed with Lian and Dr. Marie, pt does not present with acute symptoms warranting boarding in ED for bed availability and likely would not meet criteria for acute psychiatric treatment.  I further discuss with Dr. Marie to be sure, and he was in complete agreement.  I spoke with the family and informed them there are no beds available in the state and treatment team did not deem pt appropriate for boarding in the ED pending bed availability.  They were agreeable to securing all medications, and I had a lengthy discussion with them regarding limited means and access being a strong protective factor against self-harming behaviors.  They are interested in in-home services and allowed me to include [facility] on the release of information for referral purposes.  Pt being discharged home with family and follow-up with established care.     Jen Walton [facility]     [facility]  03/12/20 8922     30-Mar-2023 09:00

## 2023-03-30 NOTE — PROVIDER CONTACT NOTE (OTHER) - RECOMMENDATIONS
Notify ACP, continue to monitor, encourage patient to call for assistance and compliance with bed alarm

## 2023-03-30 NOTE — PROVIDER CONTACT NOTE (OTHER) - ACTION/TREATMENT ORDERED:
ACP aware, continue to monitor, encourage patient to call for assistance and compliance with bed alarm

## 2023-03-31 ENCOUNTER — TRANSCRIPTION ENCOUNTER (OUTPATIENT)
Age: 73
End: 2023-03-31

## 2023-03-31 VITALS
TEMPERATURE: 98 F | SYSTOLIC BLOOD PRESSURE: 103 MMHG | RESPIRATION RATE: 18 BRPM | DIASTOLIC BLOOD PRESSURE: 61 MMHG | OXYGEN SATURATION: 97 % | HEART RATE: 78 BPM

## 2023-03-31 PROCEDURE — 82330 ASSAY OF CALCIUM: CPT

## 2023-03-31 PROCEDURE — 86036 ANCA SCREEN EACH ANTIBODY: CPT

## 2023-03-31 PROCEDURE — G0378: CPT

## 2023-03-31 PROCEDURE — 85018 HEMOGLOBIN: CPT

## 2023-03-31 PROCEDURE — 80048 BASIC METABOLIC PNL TOTAL CA: CPT

## 2023-03-31 PROCEDURE — 36415 COLL VENOUS BLD VENIPUNCTURE: CPT

## 2023-03-31 PROCEDURE — 83605 ASSAY OF LACTIC ACID: CPT

## 2023-03-31 PROCEDURE — 85014 HEMATOCRIT: CPT

## 2023-03-31 PROCEDURE — 83993 ASSAY FOR CALPROTECTIN FECAL: CPT

## 2023-03-31 PROCEDURE — 0225U NFCT DS DNA&RNA 21 SARSCOV2: CPT

## 2023-03-31 PROCEDURE — 84132 ASSAY OF SERUM POTASSIUM: CPT

## 2023-03-31 PROCEDURE — 85652 RBC SED RATE AUTOMATED: CPT

## 2023-03-31 PROCEDURE — 87449 NOS EACH ORGANISM AG IA: CPT

## 2023-03-31 PROCEDURE — 86140 C-REACTIVE PROTEIN: CPT

## 2023-03-31 PROCEDURE — 85027 COMPLETE CBC AUTOMATED: CPT

## 2023-03-31 PROCEDURE — 85025 COMPLETE CBC W/AUTO DIFF WBC: CPT

## 2023-03-31 PROCEDURE — 86803 HEPATITIS C AB TEST: CPT

## 2023-03-31 PROCEDURE — 82803 BLOOD GASES ANY COMBINATION: CPT

## 2023-03-31 PROCEDURE — 85045 AUTOMATED RETICULOCYTE COUNT: CPT

## 2023-03-31 PROCEDURE — 93005 ELECTROCARDIOGRAM TRACING: CPT

## 2023-03-31 PROCEDURE — 93010 ELECTROCARDIOGRAM REPORT: CPT

## 2023-03-31 PROCEDURE — 83520 IMMUNOASSAY QUANT NOS NONAB: CPT

## 2023-03-31 PROCEDURE — 87493 C DIFF AMPLIFIED PROBE: CPT

## 2023-03-31 PROCEDURE — 87324 CLOSTRIDIUM AG IA: CPT

## 2023-03-31 PROCEDURE — 99285 EMERGENCY DEPT VISIT HI MDM: CPT

## 2023-03-31 PROCEDURE — 85610 PROTHROMBIN TIME: CPT

## 2023-03-31 PROCEDURE — 87507 IADNA-DNA/RNA PROBE TQ 12-25: CPT

## 2023-03-31 PROCEDURE — 84295 ASSAY OF SERUM SODIUM: CPT

## 2023-03-31 PROCEDURE — 71046 X-RAY EXAM CHEST 2 VIEWS: CPT

## 2023-03-31 PROCEDURE — 85730 THROMBOPLASTIN TIME PARTIAL: CPT

## 2023-03-31 PROCEDURE — 74177 CT ABD & PELVIS W/CONTRAST: CPT

## 2023-03-31 PROCEDURE — 80053 COMPREHEN METABOLIC PANEL: CPT

## 2023-03-31 PROCEDURE — 86900 BLOOD TYPING SEROLOGIC ABO: CPT

## 2023-03-31 PROCEDURE — 82435 ASSAY OF BLOOD CHLORIDE: CPT

## 2023-03-31 PROCEDURE — 86850 RBC ANTIBODY SCREEN: CPT

## 2023-03-31 PROCEDURE — 82947 ASSAY GLUCOSE BLOOD QUANT: CPT

## 2023-03-31 PROCEDURE — 86901 BLOOD TYPING SEROLOGIC RH(D): CPT

## 2023-03-31 RX ORDER — METOPROLOL TARTRATE 50 MG
1 TABLET ORAL
Qty: 60 | Refills: 0
Start: 2023-03-31 | End: 2023-04-29

## 2023-03-31 RX ORDER — VANCOMYCIN HCL 1 G
1 VIAL (EA) INTRAVENOUS
Qty: 28 | Refills: 0
Start: 2023-03-31 | End: 2023-04-06

## 2023-03-31 RX ADMIN — Medication 125 MILLIGRAM(S): at 12:06

## 2023-03-31 RX ADMIN — Medication 125 MILLIGRAM(S): at 05:51

## 2023-03-31 RX ADMIN — Medication 25 MILLIGRAM(S): at 13:25

## 2023-03-31 RX ADMIN — Medication 25 MILLIGRAM(S): at 05:50

## 2023-03-31 RX ADMIN — CHLORHEXIDINE GLUCONATE 1 APPLICATION(S): 213 SOLUTION TOPICAL at 05:15

## 2023-03-31 NOTE — DISCHARGE NOTE PROVIDER - CARE PROVIDER_API CALL
Luis Manuel Burgess (DO)  Cardiology; Internal Medicine  [address] [place] [address], [address]  [place] [address]  Phone: [phone]  Fax: [phone]  Follow Up Time: 1 week

## 2023-03-31 NOTE — DISCHARGE NOTE NURSING/CASE MANAGEMENT/SOCIAL WORK - NSDCPEFALRISK_GEN_ALL_CORE
For information on Fall & Injury Prevention, visit: [email or web address] OR  [email or web address] OR  https://www.cdc.gov/steadi/patient.html

## 2023-03-31 NOTE — CHART NOTE - NSCHARTNOTEFT_GEN_A_CORE
Patient medically cleared per Dr. Oneil. D/C meds d/w Dr. Oneil, no sotalol on dc as per cardiology, changed to metoprolol tartrate 50 mg BID as d/w Dr. Oneil.   D/C paperwork updated, Rx sent to preferred pharmacy.   Hemodynamically stable for discharge today. CM aware.

## 2023-03-31 NOTE — PROGRESS NOTE ADULT - PROBLEM SELECTOR PLAN 2
s/p PCI (4-5 years ago)    - on ASA/Plavix - holding DAPT in setting of GI Bleed

## 2023-03-31 NOTE — DISCHARGE NOTE PROVIDER - HOSPITAL COURSE
72 male h/o cad s/p pci, here with bloody diarrhea  #Bloody diarrhea, GI consulted, CT a/p with pancolitis, GI PCR negative, c diff positive, started on Vanco x 10 days. Hemoglobin stable    #CAD s/p PCI- Cardiology consulted, hold asa/plavix in setting of gi bleed, to be continued when seen by cardiology outpatient.     afib- hold AC in setting of bleed, sotalol discontinued per cardiology, continue metoprolol tartrate 50 mg twice a day upon discharge.     May resume Xarelto on discharge    Patient medically cleared per Dr. Oneil with PCP and cardiology follow up.

## 2023-03-31 NOTE — PROGRESS NOTE ADULT - SUBJECTIVE AND OBJECTIVE BOX
Subjective: Patient seen and examined. No new events except as noted.   No CP, palpitations.  On iso for r/o cdiff.    REVIEW OF SYSTEMS:    CONSTITUTIONAL: + weakness, fevers or chills  EYES/ENT: No visual changes;  No vertigo or throat pain   NECK: No pain or stiffness  RESPIRATORY: No cough, wheezing, hemoptysis; No shortness of breath  CARDIOVASCULAR: No chest pain or palpitations  GASTROINTESTINAL: No abdominal or epigastric pain. No nausea, vomiting, or hematemesis; No diarrhea or constipation. No melena or hematochezia.  GENITOURINARY: No dysuria, frequency or hematuria  NEUROLOGICAL: No numbness or weakness  SKIN: No itching, burning, rashes, or lesions   All other review of systems is negative unless indicated above.    MEDICATIONS:  MEDICATIONS  (STANDING):  metroNIDAZOLE  IVPB      metroNIDAZOLE  IVPB 500 milliGRAM(s) IV Intermittent every 8 hours  sotalol. 80 milliGRAM(s) Oral every 24 hours    PHYSICAL EXAM:  Vital Signs Last 24 Hrs  T(C): 36.7 (30 Mar 2023 11:08), Max: 36.7 (29 Mar 2023 21:04)  T(F): 98.1 (30 Mar 2023 11:08), Max: 98.1 (29 Mar 2023 21:04)  HR: 94 (30 Mar 2023 11:08) (90 - 103)  BP: 118/80 (30 Mar 2023 11:08) (93/64 - 119/73)  BP(mean): --  RR: 18 (30 Mar 2023 11:08) (16 - 18)  SpO2: 97% (30 Mar 2023 11:08) (97% - 99%)    Parameters below as of 30 Mar 2023 11:08  Patient On (Oxygen Delivery Method): room air    I&O's Summary    Appearance: Normal	  HEENT:   Normal oral mucosa, PERRL, EOMI	  Lymphatic: No lymphadenopathy , no edema  Cardiovascular: Irregular S1 S2, No JVD, No murmurs , Peripheral pulses palpable 2+ bilaterally  Respiratory: Lungs clear to auscultation, normal effort 	  Gastrointestinal:  Soft, Non-tender, + BS	  Skin: No rashes, No ecchymoses, No cyanosis, warm to touch  Musculoskeletal: Normal range of motion, normal strength  Psychiatry:  Mood & affect appropriate  Ext: No edema    LABS:    CARDIAC MARKERS:                        13.5   6.23  )-----------( 213      ( 30 Mar 2023 07:53 )             40.4     03-30    140  |  104  |  10  ----------------------------<  97  3.7   |  23  |  1.02    Ca    8.4      30 Mar 2023 07:53    TPro  5.9<L>  /  Alb  2.8<L>  /  TBili  1.0  /  DBili  x   /  AST  54<H>  /  ALT  39  /  AlkPhos  71  03-30    proBNP:   Lipid Profile:   HgA1c:   TSH:     TELEMETRY: Afib    ECG:  	  RADIOLOGY:   DIAGNOSTIC TESTING:  [ ] Echocardiogram:  [ ]  Catheterization:  [ ] Stress Test:    OTHER: 	
YEONG LEE  72y Male  MRN:[id number]    Patient is a 72y old  Male who presents with a chief complaint of bloody diarrhea      HPI:  73 yo male h/o afib on xarelto,  cad s/p pci, here with c/o diarrhea x4 weeks, which has been bloody past 2 weeks.  no n/v  no chest pain  or sob     (27 Mar 2023 19:32)      Patient seen and evaluated at bedside. No acute events overnight except as noted.    Interval HPI: +bloody bm today     PAST MEDICAL & SURGICAL HISTORY:  CAD (coronary artery disease)          REVIEW OF SYSTEMS:  as per hpi     VITALS:  Vital Signs Last 24 Hrs  T(C): 36.5 (28 Mar 2023 05:42), Max: 37 (27 Mar 2023 12:53)  T(F): 97.7 (28 Mar 2023 05:42), Max: 98.6 (27 Mar 2023 12:53)  HR: 63 (28 Mar 2023 05:42) (56 - 141)  BP: 112/64 (28 Mar 2023 05:42) (100/64 - 130/61)  BP(mean): 72 (27 Mar 2023 14:16) (72 - 73)  RR: 18 (28 Mar 2023 05:42) (16 - 19)  SpO2: 99% (28 Mar 2023 05:42) (97% - 100%)    Parameters below as of 28 Mar 2023 05:42  Patient On (Oxygen Delivery Method): room air      CAPILLARY BLOOD GLUCOSE        I&O's Summary      PHYSICAL EXAM:  GENERAL: NAD, well-developed  HEAD:  Atraumatic, Normocephalic  EYES: EOMI, PERRLA, conjunctiva and sclera clear  NECK: Supple, No JVD  CHEST/LUNG: Clear to auscultation bilaterally; No wheeze  HEART: S1, S2; No murmurs, rubs, or gallops  ABDOMEN: Soft, Nontender, Nondistended; Bowel sounds present  EXTREMITIES:  2+ Peripheral Pulses, No clubbing, cyanosis, or edema  PSYCH: Normal affect  NEUROLOGY: AAOX3; non-focal  SKIN: No rashes or lesions    Consultant(s) Notes Reviewed:  [x ] YES  [ ] NO  Care Discussed with Consultants/Other Providers [ x] YES  [ ] NO    MEDS:  MEDICATIONS  (STANDING):  ciprofloxacin   IVPB      metroNIDAZOLE  IVPB      sotalol. 80 milliGRAM(s) Oral every 24 hours    MEDICATIONS  (PRN):    ALLERGIES:  No Known Allergies      LABS:                        11.8   5.12  )-----------( 191      ( 28 Mar 2023 06:51 )             35.1     03-28    138  |  103  |  12  ----------------------------<  102<H>  3.9   |  25  |  0.96    Ca    8.3<L>      28 Mar 2023 06:51    TPro  6.5  /  Alb  3.2<L>  /  TBili  1.4<H>  /  DBili  x   /  AST  52<H>  /  ALT  46<H>  /  AlkPhos  73  03-27    PT/INR - ( 27 Mar 2023 13:21 )   PT: 19.5 sec;   INR: 1.67 ratio         PTT - ( 27 Mar 2023 13:21 )  PTT:33.9 sec      LIVER FUNCTIONS - ( 27 Mar 2023 13:21 )  Alb: 3.2 g/dL / Pro: 6.5 g/dL / ALK PHOS: 73 U/L / ALT: 46 U/L / AST: 52 U/L / GGT: x              < from: CT Abdomen and Pelvis w/ IV Cont (03.27.23 @ 14:36) >  IMPRESSION:  Findings suggestive of pancolitis/proctitis.    Prominent vessels along the lowrectum, which may represent hemorrhoids.   No evidence of active gastrointestinal hemorrhage at this time.    Small volume of free abdominopelvic fluid.        --- End of Report ---        < end of copied text >  
[facility]  Myke Lau PA-C  [address] [place], [place] [id number] [phone]      INTERVAL HPI/OVERNIGHT EVENTS:  pt seen and examined, continues to have bloody diarrhea  GI PCR neg  tolerating diet, no N/V  no abdominal pain     MEDICATIONS  (STANDING):  metroNIDAZOLE  IVPB      metroNIDAZOLE  IVPB 500 milliGRAM(s) IV Intermittent every 8 hours  sotalol. 80 milliGRAM(s) Oral every 24 hours    MEDICATIONS  (PRN):      Allergies    No Known Allergies    Intolerances        ROS:   General:  No wt loss, fevers, chills, night sweats, fatigue,   Eyes:  Good vision, no reported pain  ENT:  No sore throat, pain, runny nose, dysphagia  CV:  No pain, palpitations, hypo/hypertension  Resp:  No dyspnea, cough, tachypnea, wheezing  GI:  No pain, No nausea, No vomiting, + diarrhea, No constipation, No weight loss, No fever, No pruritis, + rectal bleeding, No tarry stools, No dysphagia,  :  No pain, bleeding, incontinence, nocturia  Muscle:  No pain, weakness  Neuro:  No weakness, tingling, memory problems  Psych:  No fatigue, insomnia, mood problems, depression  Endocrine:  No polyuria, polydipsia, cold/heat intolerance  Heme:  No petechiae, ecchymosis, easy bruisability  Skin:  No rash, tattoos, scars, edema      PHYSICAL EXAM:   Vital Signs:  Vital Signs Last 24 Hrs  T(C): 36.3 (29 Mar 2023 10:13), Max: 36.3 (29 Mar 2023 05:28)  T(F): 97.4 (29 Mar 2023 10:13), Max: 97.4 (29 Mar 2023 10:13)  HR: 67 (29 Mar 2023 10:13) (67 - 98)  BP: 108/60 (29 Mar 2023 10:13) (106/58 - 108/60)  BP(mean): --  RR: 17 (29 Mar 2023 10:13) (17 - 18)  SpO2: 99% (29 Mar 2023 10:13) (98% - 99%)    Parameters below as of 29 Mar 2023 10:13  Patient On (Oxygen Delivery Method): room air      Daily     Daily     GENERAL:  Appears stated age,   HEENT:  NC/AT,    CHEST:  Full & symmetric excursion,   HEART:  Regular rhythm,  ABDOMEN:  Soft, non-tender, non-distended,  EXTEREMITIES:  no cyanosis  SKIN:  No rash  NEURO:  Alert,       LABS:                        11.8   5.12  )-----------( 191      ( 28 Mar 2023 06:51 )             35.1     03-28    138  |  103  |  12  ----------------------------<  102<H>  3.9   |  25  |  0.96    Ca    8.3<L>      28 Mar 2023 06:51            RADIOLOGY & ADDITIONAL TESTS:  
[facility]  Myke Lau PA-C  [address] [place] [id number] [phone]      INTERVAL HPI/OVERNIGHT EVENTS:  pt seen and examined, no new events  states no further diarrhea  tolerating regular diet, no N/V  asking to go home     MEDICATIONS  (STANDING):  metroNIDAZOLE  IVPB      metroNIDAZOLE  IVPB 500 milliGRAM(s) IV Intermittent every 8 hours  sotalol. 80 milliGRAM(s) Oral every 24 hours    MEDICATIONS  (PRN):      Allergies    No Known Allergies    Intolerances        ROS:   General:  No wt loss, fevers, chills, night sweats, fatigue,   Eyes:  Good vision, no reported pain  ENT:  No sore throat, pain, runny nose, dysphagia  CV:  No pain, palpitations, hypo/hypertension  Resp:  No dyspnea, cough, tachypnea, wheezing  GI:  No pain, No nausea, No vomiting, No diarrhea, No constipation, No weight loss, No fever, No pruritis, + rectal bleeding, No tarry stools, No dysphagia,  :  No pain, bleeding, incontinence, nocturia  Muscle:  No pain, weakness  Neuro:  No weakness, tingling, memory problems  Psych:  No fatigue, insomnia, mood problems, depression  Endocrine:  No polyuria, polydipsia, cold/heat intolerance  Heme:  No petechiae, ecchymosis, easy bruisability  Skin:  No rash, tattoos, scars, edema      PHYSICAL EXAM:   Vital Signs Last 24 Hrs  T(C): 36.5 (31 Mar 2023 08:45), Max: 36.7 (30 Mar 2023 11:08)  T(F): 97.7 (31 Mar 2023 08:45), Max: 98.1 (30 Mar 2023 11:08)  HR: 82 (31 Mar 2023 09:00) (82 - 146)  BP: 109/76 (31 Mar 2023 08:45) (109/76 - 129/81)  BP(mean): --  RR: 18 (31 Mar 2023 08:45) (18 - 18)  SpO2: 97% (31 Mar 2023 08:45) (96% - 97%)    Parameters below as of 31 Mar 2023 08:45  Patient On (Oxygen Delivery Method): room air      Daily     Daily     GENERAL:  Appears stated age,   HEENT:  NC/AT,    CHEST:  Full & symmetric excursion,   HEART:  Regular rhythm,  ABDOMEN:  Soft, non-tender, non-distended,  EXTEREMITIES:  no cyanosis  SKIN:  No rash  NEURO:  Alert,       LABS:                                   13.5   6.23  )-----------( 213      ( 30 Mar 2023 07:53 )             40.4   03-30    140  |  104  |  10  ----------------------------<  97  3.7   |  23  |  1.02    Ca    8.4      30 Mar 2023 07:53    TPro  5.9<L>  /  Alb  2.8<L>  /  TBili  1.0  /  DBili  x   /  AST  54<H>  /  ALT  39  /  AlkPhos  71  03-30        RADIOLOGY & ADDITIONAL TESTS:  
[facility]  Myke Lau PA-C  [address] [place] [address]  [phone]      INTERVAL HPI/OVERNIGHT EVENTS:  pt seen and examined, events noted  +C diff  had 3 episodes of diarrhea overnight, nom bloody   tolerating diet     MEDICATIONS  (STANDING):  metroNIDAZOLE  IVPB      metroNIDAZOLE  IVPB 500 milliGRAM(s) IV Intermittent every 8 hours  sotalol. 80 milliGRAM(s) Oral every 24 hours    MEDICATIONS  (PRN):      Allergies    No Known Allergies    Intolerances        ROS:   General:  No wt loss, fevers, chills, night sweats, fatigue,   Eyes:  Good vision, no reported pain  ENT:  No sore throat, pain, runny nose, dysphagia  CV:  No pain, palpitations, hypo/hypertension  Resp:  No dyspnea, cough, tachypnea, wheezing  GI:  No pain, No nausea, No vomiting, + diarrhea, No constipation, No weight loss, No fever, No pruritis, + rectal bleeding, No tarry stools, No dysphagia,  :  No pain, bleeding, incontinence, nocturia  Muscle:  No pain, weakness  Neuro:  No weakness, tingling, memory problems  Psych:  No fatigue, insomnia, mood problems, depression  Endocrine:  No polyuria, polydipsia, cold/heat intolerance  Heme:  No petechiae, ecchymosis, easy bruisability  Skin:  No rash, tattoos, scars, edema      PHYSICAL EXAM:   Vital Signs Last 24 Hrs  T(C): 36.7 (30 Mar 2023 11:08), Max: 36.7 (29 Mar 2023 21:04)  T(F): 98.1 (30 Mar 2023 11:08), Max: 98.1 (29 Mar 2023 21:04)  HR: 94 (30 Mar 2023 11:08) (90 - 103)  BP: 118/80 (30 Mar 2023 11:08) (93/64 - 119/73)  BP(mean): --  RR: 18 (30 Mar 2023 11:08) (16 - 18)  SpO2: 97% (30 Mar 2023 11:08) (97% - 99%)    Parameters below as of 30 Mar 2023 11:08  Patient On (Oxygen Delivery Method): room air    Daily     Daily     GENERAL:  Appears stated age,   HEENT:  NC/AT,    CHEST:  Full & symmetric excursion,   HEART:  Regular rhythm,  ABDOMEN:  Soft, non-tender, non-distended,  EXTEREMITIES:  no cyanosis  SKIN:  No rash  NEURO:  Alert,       LABS:                        13.5   6.23  )-----------( 213      ( 30 Mar 2023 07:53 )             40.4   03-30    140  |  104  |  10  ----------------------------<  97  3.7   |  23  |  1.02    Ca    8.4      30 Mar 2023 07:53    TPro  5.9<L>  /  Alb  2.8<L>  /  TBili  1.0  /  DBili  x   /  AST  54<H>  /  ALT  39  /  AlkPhos  71  03-30          RADIOLOGY & ADDITIONAL TESTS:  
YEONG LEE  72y Male  MRN:[id number]    Patient is a 72y old  Male who presents with a chief complaint of bloody diarrhea      HPI:  71 yo male h/o afib on xarelto,  cad s/p pci, here with c/o diarrhea x4 weeks, which has been bloody past 2 weeks.  no n/v  no chest pain  or sob     (27 Mar 2023 19:32)      Patient seen and evaluated at bedside. No acute events overnight except as noted.    Interval HPI: feels better. wants to go home     PAST MEDICAL & SURGICAL HISTORY:  CAD (coronary artery disease)          REVIEW OF SYSTEMS:  as per hpi     VITALS:   Vital Signs Last 24 Hrs  T(C): 36.6 (31 Mar 2023 11:26), Max: 36.7 (30 Mar 2023 20:45)  T(F): 97.8 (31 Mar 2023 11:26), Max: 98.1 (30 Mar 2023 20:45)  HR: 78 (31 Mar 2023 11:26) (78 - 146)  BP: 103/61 (31 Mar 2023 11:26) (103/61 - 129/81)  BP(mean): --  RR: 18 (31 Mar 2023 11:26) (18 - 18)  SpO2: 97% (31 Mar 2023 11:26) (96% - 97%)    Parameters below as of 31 Mar 2023 11:26  Patient On (Oxygen Delivery Method): room air          PHYSICAL EXAM:  GENERAL: NAD, well-developed  HEAD:  Atraumatic, Normocephalic  EYES: EOMI, PERRLA, conjunctiva and sclera clear  NECK: Supple, No JVD  CHEST/LUNG: Clear to auscultation bilaterally; No wheeze  HEART: S1, S2; No murmurs, rubs, or gallops  ABDOMEN: Soft, Nontender, Nondistended; Bowel sounds present  EXTREMITIES:  2+ Peripheral Pulses, No clubbing, cyanosis, or edema  PSYCH: Normal affect  NEUROLOGY: AAOX3; non-focal  SKIN: No rashes or lesions    Consultant(s) Notes Reviewed:  [x ] YES  [ ] NO  Care Discussed with Consultants/Other Providers [ x] YES  [ ] NO    MEDS:   MEDICATIONS  (STANDING):  chlorhexidine 2% Cloths 1 Application(s) Topical <User Schedule>  metoprolol tartrate 25 milliGRAM(s) Oral every 8 hours  vancomycin    Solution 125 milliGRAM(s) Oral every 6 hours    MEDICATIONS  (PRN):        ALLERGIES:  No Known Allergies      LABS:                                                13.5   6.23  )-----------( 213      ( 30 Mar 2023 07:53 )             40.4   03-30    140  |  104  |  10  ----------------------------<  97  3.7   |  23  |  1.02    Ca    8.4      30 Mar 2023 07:53    TPro  5.9<L>  /  Alb  2.8<L>  /  TBili  1.0  /  DBili  x   /  AST  54<H>  /  ALT  39  /  AlkPhos  71  03-30     < from: CT Abdomen and Pelvis w/ IV Cont (03.27.23 @ 14:36) >  IMPRESSION:  Findings suggestive of pancolitis/proctitis.    Prominent vessels along the lowrectum, which may represent hemorrhoids.   No evidence of active gastrointestinal hemorrhage at this time.    Small volume of free abdominopelvic fluid.        --- End of Report ---        < end of copied text >  
YEONG LEE  72y Male  MRN:[id number]    Patient is a 72y old  Male who presents with a chief complaint of bloody diarrhea      HPI:  73 yo male h/o afib on xarelto,  cad s/p pci, here with c/o diarrhea x4 weeks, which has been bloody past 2 weeks.  no n/v  no chest pain  or sob     (27 Mar 2023 19:32)      Patient seen and evaluated at bedside. No acute events overnight except as noted.    Interval HPI: +bloody bm today   +cdiff    PAST MEDICAL & SURGICAL HISTORY:  CAD (coronary artery disease)          REVIEW OF SYSTEMS:  as per hpi     VITALS:   Vital Signs Last 24 Hrs  T(C): 36.7 (30 Mar 2023 11:08), Max: 36.7 (29 Mar 2023 21:04)  T(F): 98.1 (30 Mar 2023 11:08), Max: 98.1 (29 Mar 2023 21:04)  HR: 94 (30 Mar 2023 11:08) (90 - 103)  BP: 118/80 (30 Mar 2023 11:08) (93/64 - 119/73)  BP(mean): --  RR: 18 (30 Mar 2023 11:08) (16 - 18)  SpO2: 97% (30 Mar 2023 11:08) (97% - 99%)    Parameters below as of 30 Mar 2023 11:08  Patient On (Oxygen Delivery Method): room air          PHYSICAL EXAM:  GENERAL: NAD, well-developed  HEAD:  Atraumatic, Normocephalic  EYES: EOMI, PERRLA, conjunctiva and sclera clear  NECK: Supple, No JVD  CHEST/LUNG: Clear to auscultation bilaterally; No wheeze  HEART: S1, S2; No murmurs, rubs, or gallops  ABDOMEN: Soft, Nontender, Nondistended; Bowel sounds present  EXTREMITIES:  2+ Peripheral Pulses, No clubbing, cyanosis, or edema  PSYCH: Normal affect  NEUROLOGY: AAOX3; non-focal  SKIN: No rashes or lesions    Consultant(s) Notes Reviewed:  [x ] YES  [ ] NO  Care Discussed with Consultants/Other Providers [ x] YES  [ ] NO    MEDS:   MEDICATIONS  (STANDING):  chlorhexidine 2% Cloths 1 Application(s) Topical <User Schedule>  vancomycin    Solution 125 milliGRAM(s) Oral every 6 hours    MEDICATIONS  (PRN):      ALLERGIES:  No Known Allergies      LABS:                                                               13.5   6.23  )-----------( 213      ( 30 Mar 2023 07:53 )             40.4   03-30    140  |  104  |  10  ----------------------------<  97  3.7   |  23  |  1.02    Ca    8.4      30 Mar 2023 07:53    TPro  5.9<L>  /  Alb  2.8<L>  /  TBili  1.0  /  DBili  x   /  AST  54<H>  /  ALT  39  /  AlkPhos  71  03-30       < from: CT Abdomen and Pelvis w/ IV Cont (03.27.23 @ 14:36) >  IMPRESSION:  Findings suggestive of pancolitis/proctitis.    Prominent vessels along the lowrectum, which may represent hemorrhoids.   No evidence of active gastrointestinal hemorrhage at this time.    Small volume of free abdominopelvic fluid.        --- End of Report ---        < end of copied text >  
YEONG LEE  72y Male  MRN:[id number]    Patient is a 72y old  Male who presents with a chief complaint of bloody diarrhea      HPI:  73 yo male h/o afib on xarelto,  cad s/p pci, here with c/o diarrhea x4 weeks, which has been bloody past 2 weeks.  no n/v  no chest pain  or sob     (27 Mar 2023 19:32)      Patient seen and evaluated at bedside. No acute events overnight except as noted.    Interval HPI: +bloody bm today     PAST MEDICAL & SURGICAL HISTORY:  CAD (coronary artery disease)          REVIEW OF SYSTEMS:  as per hpi     VITALS:   Vital Signs Last 24 Hrs  T(C): 36.3 (29 Mar 2023 10:13), Max: 36.3 (29 Mar 2023 05:28)  T(F): 97.4 (29 Mar 2023 10:13), Max: 97.4 (29 Mar 2023 10:13)  HR: 67 (29 Mar 2023 10:13) (67 - 98)  BP: 108/60 (29 Mar 2023 10:13) (106/58 - 108/60)  BP(mean): --  RR: 17 (29 Mar 2023 10:13) (17 - 18)  SpO2: 99% (29 Mar 2023 10:13) (98% - 99%)    Parameters below as of 29 Mar 2023 10:13  Patient On (Oxygen Delivery Method): room air          PHYSICAL EXAM:  GENERAL: NAD, well-developed  HEAD:  Atraumatic, Normocephalic  EYES: EOMI, PERRLA, conjunctiva and sclera clear  NECK: Supple, No JVD  CHEST/LUNG: Clear to auscultation bilaterally; No wheeze  HEART: S1, S2; No murmurs, rubs, or gallops  ABDOMEN: Soft, Nontender, Nondistended; Bowel sounds present  EXTREMITIES:  2+ Peripheral Pulses, No clubbing, cyanosis, or edema  PSYCH: Normal affect  NEUROLOGY: AAOX3; non-focal  SKIN: No rashes or lesions    Consultant(s) Notes Reviewed:  [x ] YES  [ ] NO  Care Discussed with Consultants/Other Providers [ x] YES  [ ] NO    MEDS:   MEDICATIONS  (STANDING):  metroNIDAZOLE  IVPB      metroNIDAZOLE  IVPB 500 milliGRAM(s) IV Intermittent every 8 hours  sotalol. 80 milliGRAM(s) Oral every 24 hours    MEDICATIONS  (PRN):      ALLERGIES:  No Known Allergies      LABS:                                          11.8   5.12  )-----------( 191      ( 28 Mar 2023 06:51 )             35.1   03-28    138  |  103  |  12  ----------------------------<  102<H>  3.9   |  25  |  0.96    Ca    8.3<L>      28 Mar 2023 06:51    TPro  6.5  /  Alb  3.2<L>  /  TBili  1.4<H>  /  DBili  x   /  AST  52<H>  /  ALT  46<H>  /  AlkPhos  73  03-27       < from: CT Abdomen and Pelvis w/ IV Cont (03.27.23 @ 14:36) >  IMPRESSION:  Findings suggestive of pancolitis/proctitis.    Prominent vessels along the lowrectum, which may represent hemorrhoids.   No evidence of active gastrointestinal hemorrhage at this time.    Small volume of free abdominopelvic fluid.        --- End of Report ---        < end of copied text >  
Subjective: Patient seen and examined. No new events except as noted.     REVIEW OF SYSTEMS:    CONSTITUTIONAL: + weakness, fevers or chills  EYES/ENT: No visual changes;  No vertigo or throat pain   NECK: No pain or stiffness  RESPIRATORY: No cough, wheezing, hemoptysis; No shortness of breath  CARDIOVASCULAR: No chest pain or palpitations  GASTROINTESTINAL: No abdominal or epigastric pain. No nausea, vomiting, or hematemesis; No diarrhea or constipation. No melena or hematochezia.  GENITOURINARY: No dysuria, frequency or hematuria  NEUROLOGICAL: No numbness or weakness  SKIN: No itching, burning, rashes, or lesions   All other review of systems is negative unless indicated above.    MEDICATIONS:  MEDICATIONS  (STANDING):  metroNIDAZOLE  IVPB      metroNIDAZOLE  IVPB 500 milliGRAM(s) IV Intermittent every 8 hours  sotalol. 80 milliGRAM(s) Oral every 24 hours    PHYSICAL EXAM:  T(C): 36.3 (03-29-23 @ 10:13), Max: 36.3 (03-29-23 @ 05:28)  HR: 67 (03-29-23 @ 10:13) (67 - 98)  BP: 108/60 (03-29-23 @ 10:13) (106/58 - 108/60)  RR: 17 (03-29-23 @ 10:13) (17 - 18)  SpO2: 99% (03-29-23 @ 10:13) (98% - 99%)  Wt(kg): --  I&O's Summary    28 Mar 2023 07:01  -  29 Mar 2023 07:00  --------------------------------------------------------  IN: 100 mL / OUT: 0 mL / NET: 100 mL    Appearance: Normal	  HEENT:   Normal oral mucosa, PERRL, EOMI	  Lymphatic: No lymphadenopathy , no edema  Cardiovascular: Irregular S1 S2, No JVD, No murmurs , Peripheral pulses palpable 2+ bilaterally  Respiratory: Lungs clear to auscultation, normal effort 	  Gastrointestinal:  Soft, Non-tender, + BS	  Skin: No rashes, No ecchymoses, No cyanosis, warm to touch  Musculoskeletal: Normal range of motion, normal strength  Psychiatry:  Mood & affect appropriate  Ext: No edema    LABS:    CARDIAC MARKERS:                        11.8   5.12  )-----------( 191      ( 28 Mar 2023 06:51 )             35.1     03-28    138  |  103  |  12  ----------------------------<  102<H>  3.9   |  25  |  0.96    Ca    8.3<L>      28 Mar 2023 06:51    TPro  6.5  /  Alb  3.2<L>  /  TBili  1.4<H>  /  DBili  x   /  AST  52<H>  /  ALT  46<H>  /  AlkPhos  73  03-27    proBNP:   Lipid Profile:   HgA1c:   TSH:     TELEMETRY: Afib 90-110s	    ECG:  	  RADIOLOGY:   DIAGNOSTIC TESTING:  [ ] Echocardiogram:  [ ]  Catheterization:  [ ] Stress Test:    OTHER: 	
Subjective: Patient seen and examined. No new events except as noted.   converted to SR       REVIEW OF SYSTEMS:    CONSTITUTIONAL:+ weakness, fevers or chills  EYES/ENT: No visual changes;  No vertigo or throat pain   NECK: No pain or stiffness  RESPIRATORY: No cough, wheezing, hemoptysis; No shortness of breath  CARDIOVASCULAR: No chest pain or palpitations  GASTROINTESTINAL: No abdominal or epigastric pain. No nausea, vomiting, or hematemesis; No diarrhea or constipation. No melena or hematochezia.  GENITOURINARY: No dysuria, frequency or hematuria  NEUROLOGICAL: No numbness or weakness  SKIN: No itching, burning, rashes, or lesions   All other review of systems is negative unless indicated above.    MEDICATIONS:  MEDICATIONS  (STANDING):  chlorhexidine 2% Cloths 1 Application(s) Topical <User Schedule>  metoprolol tartrate 25 milliGRAM(s) Oral every 8 hours  vancomycin    Solution 125 milliGRAM(s) Oral every 6 hours      PHYSICAL EXAM:  T(C): 36.5 (03-31-23 @ 08:45), Max: 36.7 (03-30-23 @ 11:08)  HR: 82 (03-31-23 @ 09:00) (82 - 146)  BP: 109/76 (03-31-23 @ 08:45) (109/76 - 129/81)  RR: 18 (03-31-23 @ 08:45) (18 - 18)  SpO2: 97% (03-31-23 @ 08:45) (96% - 97%)  Wt(kg): --  I&O's Summary    30 Mar 2023 07:01  -  31 Mar 2023 07:00  --------------------------------------------------------  IN: 1080 mL / OUT: 0 mL / NET: 1080 mL          Appearance: Normal	  HEENT:   Normal oral mucosa, PERRL, EOMI	  Lymphatic: No lymphadenopathy , no edema  Cardiovascular: Normal S1 S2, No JVD, No murmurs , Peripheral pulses palpable 2+ bilaterally  Respiratory: Lungs clear to auscultation, normal effort 	  Gastrointestinal:  Soft, Non-tender, + BS	  Skin: No rashes, No ecchymoses, No cyanosis, warm to touch  Musculoskeletal: Normal range of motion, normal strength  Psychiatry:  Mood & affect appropriate  Ext: No edema      LABS:    CARDIAC MARKERS:    C Diff by PCR Result: Detected: The results of this test should be interpreted with consideration of                             13.5   6.23  )-----------( 213      ( 30 Mar 2023 07:53 )             40.4     03-30    140  |  104  |  10  ----------------------------<  97  3.7   |  23  |  1.02    Ca    8.4      30 Mar 2023 07:53    TPro  5.9<L>  /  Alb  2.8<L>  /  TBili  1.0  /  DBili  x   /  AST  54<H>  /  ALT  39  /  AlkPhos  71  03-30    proBNP:   Lipid Profile:   HgA1c:   TSH:             TELEMETRY: 	SR    ECG:  	NSR qtc 454  RADIOLOGY:   DIAGNOSTIC TESTING:  [ ] Echocardiogram:  [ ]  Catheterization:  [ ] Stress Test:    OTHER:

## 2023-03-31 NOTE — PROGRESS NOTE ADULT - PROBLEM SELECTOR PLAN 1
blood stools x2 weeks    - hgb stable   CT A/P - pancolitis/proctitis  IV Antibiotics  GI consult
blood stools x2 weeks    - hgb stable   CT A/P - pancolitis/proctitis  CDIFF  IV Antibiotics  GI consult
blood stools x2 weeks    - hgb stable   CT A/P - pancolitis/proctitis  IV Antibiotics  GI consult

## 2023-03-31 NOTE — DISCHARGE NOTE NURSING/CASE MANAGEMENT/SOCIAL WORK - PATIENT PORTAL LINK FT
You can access the FollowMyHealth Patient Portal offered by [facility] by registering at the following website: [email or web address]. By joining [employer]’s FollowMyHealth portal, you will also be able to view your health information using other applications (apps) compatible with our system.

## 2023-03-31 NOTE — DISCHARGE NOTE PROVIDER - NSDCFUADDAPPT_GEN_ALL_CORE_FT
APPTS ARE READY TO BE MADE: [ X] YES    Best Family or Patient Contact (if needed):    Additional Information about above appointments (if needed):    1: PCP within 1-2 week with Valdemar Gaines [phone]  2: Cardiology within 1 week  3:     Other comments or requests:

## 2023-03-31 NOTE — PROGRESS NOTE ADULT - PROBLEM SELECTOR PLAN 3
Applied
now in SR   cont metoprolol  on Xarelto - holding AC in setting of GI Bleed  monitor on tele
c/w sotalol --> changed to metoprolol for better rate control  on Xarelto - holding AC in setting of GI Bleed  monitor on tele
rate controlled  c/w sotalol  on Xarelto - holding AC in setting of GI Bleed  monitor on tele

## 2023-03-31 NOTE — DISCHARGE NOTE PROVIDER - NSDCCPCAREPLAN_GEN_ALL_CORE_FT
PRINCIPAL DISCHARGE DIAGNOSIS  Diagnosis: Pancolitis  Assessment and Plan of Treatment: Seen by GI for bloody diarrhea x 2 weeks  CT performed showing pancolitis  Continue vanco for additional 7 days   Follow up with PCP         SECONDARY DISCHARGE DIAGNOSES  Diagnosis: CAD (coronary artery disease)  Assessment and Plan of Treatment: Coronary artery disease is a condition where the arteries the supply the heart muscle get clogged with fatty deposits & puts you at risk for a heart attack.  Call your doctor if you have any new pain, pressure, or discomfort in the center of your chest, pain, tingling or discomfort in arms, back, neck, jaw, or stomach, shortness of breath, nausea, vomiting, burping or heartburn, sweating, cold and clammy skin, racing or abnormal heartbeat for more than 10 minutes or if they keep coming & going.  Call 911 and do not try to get to hospital by car.  You can help yourself with lifestyle changes (quitting smoking if you smoke), eat lots of fruits & vegetables & low fat dairy products, not a lot of meat & fatty foods, walk or some form of physical activity most days of the week, lose weight if you are overweight.  Take your cardiac medication as prescribed to lower cholesterol, to lower blood pressure, and control your blood sugar.  FOLLOW UP WITH CARDIOLOGY ON WHEN TO RESUME ASPRIN AND PLAVIX- held in setting of GI bleeding      Diagnosis: Atrial fibrillation  Assessment and Plan of Treatment: Please continue your medications as directed and follow-up with your primary provider/cardiologist to further manage your care. Monitor for signs/symptoms of uncontrolled atrial fibrillation, such as, increased heart rate, palpitations, chest pain, dizziness, or shortness of breath - Return to emergency room if these signs/symptoms are present.  You were previously on sotalol which was discontinued per cardiology and changed to metoprolol. You are to continue this medication until you are seen by cardiology.   Heart rate controlled on 3/31- converted from Afib to NSR.  You may continue your Xarelto  Xarelto/Rivaroxaban is used to thin the blood so clots will not form and to keep existing ones from getting bigger.  Take this medication daily as prescribed by your health care provider.  Take this medication with food to prevent upset stomach.  If you miss a dose call your health care provider or pharmacist right away.  Tell your doctor you use this drug before you have a spinal or epidural procedure  Tell dentists, surgeon, and other doctors that you use this drug.  You may bleed more easily.  Be careful and avoid injury.  Use a soft toothbrush and an electric razor.          PRINCIPAL DISCHARGE DIAGNOSIS  Diagnosis: Pancolitis  Assessment and Plan of Treatment: Seen by GI for bloody diarrhea x 2 weeks  CT performed showing pancolitis  Continue vanco for additional 7 days   Follow up with PCP         SECONDARY DISCHARGE DIAGNOSES  Diagnosis: Anemia due to acute blood loss  Assessment and Plan of Treatment:     Diagnosis: Atrial fibrillation  Assessment and Plan of Treatment: Please continue your medications as directed and follow-up with your primary provider/cardiologist to further manage your care. Monitor for signs/symptoms of uncontrolled atrial fibrillation, such as, increased heart rate, palpitations, chest pain, dizziness, or shortness of breath - Return to emergency room if these signs/symptoms are present.  You were previously on sotalol which was discontinued per cardiology and changed to metoprolol. You are to continue this medication until you are seen by cardiology.   Heart rate controlled on 3/31- converted from Afib to NSR.  You may continue your Xarelto  Xarelto/Rivaroxaban is used to thin the blood so clots will not form and to keep existing ones from getting bigger.  Take this medication daily as prescribed by your health care provider.  Take this medication with food to prevent upset stomach.  If you miss a dose call your health care provider or pharmacist right away.  Tell your doctor you use this drug before you have a spinal or epidural procedure  Tell dentists, surgeon, and other doctors that you use this drug.  You may bleed more easily.  Be careful and avoid injury.  Use a soft toothbrush and an electric razor.         Diagnosis: CAD (coronary artery disease)  Assessment and Plan of Treatment: Coronary artery disease is a condition where the arteries the supply the heart muscle get clogged with fatty deposits & puts you at risk for a heart attack.  Call your doctor if you have any new pain, pressure, or discomfort in the center of your chest, pain, tingling or discomfort in arms, back, neck, jaw, or stomach, shortness of breath, nausea, vomiting, burping or heartburn, sweating, cold and clammy skin, racing or abnormal heartbeat for more than 10 minutes or if they keep coming & going.  Call 911 and do not try to get to hospital by car.  You can help yourself with lifestyle changes (quitting smoking if you smoke), eat lots of fruits & vegetables & low fat dairy products, not a lot of meat & fatty foods, walk or some form of physical activity most days of the week, lose weight if you are overweight.  Take your cardiac medication as prescribed to lower cholesterol, to lower blood pressure, and control your blood sugar.  FOLLOW UP WITH CARDIOLOGY ON WHEN TO RESUME ASPRIN AND PLAVIX- held in setting of GI bleeding

## 2023-03-31 NOTE — DISCHARGE NOTE NURSING/CASE MANAGEMENT/SOCIAL WORK - NSDCFUADDAPPT_GEN_ALL_CORE_FT
APPTS ARE READY TO BE MADE: [ X] YES    Best Family or Patient Contact (if needed):    Additional Information about above appointments (if needed):    1: PCP within 1-2 week with Valdemar Gaines [phone]  2: Cardiology within 1 week  3:     Other comments or requests:

## 2023-03-31 NOTE — DISCHARGE NOTE NURSING/CASE MANAGEMENT/SOCIAL WORK - HAVE YOU HAD COVID IN THE LAST 60 DAYS?
Last medication refill 07/26/22    Last lab visit 05/10/22    Recent Visits  Date Type Provider Dept   05/10/22 Office Visit Nic Tobar MD  Internal Medicine   04/20/21 Office Visit Nic Tobar MD  Internal Medicine   Showing recent visits within past 540 days with a meds authorizing provider and meeting all other requirements  Future Appointments  No visits were found meeting these conditions.  Showing future appointments within next 150 days with a meds authorizing provider and meeting all other requirements     No

## 2023-03-31 NOTE — DISCHARGE NOTE PROVIDER - NSDCMRMEDTOKEN_GEN_ALL_CORE_FT
atorvastatin 20 mg oral tablet: 1 tab(s) orally once a day  Metoprolol Tartrate 50 mg oral tablet: 1 tab(s) orally 2 times a day  vancomycin 125 mg oral capsule: 1 cap(s) orally every 6 hours  Xarelto 20 mg oral tablet: 1 tab(s) orally once a day

## 2023-03-31 NOTE — PROGRESS NOTE ADULT - ASSESSMENT
72 male h/o cad s/p pci, here with bloody diarrhea    bloody diarrhea  pancolitis on CT  gi consulted  cipro/flagyl  check stool pcr  active type and screen  serial cbc    cad s/p pci  hold asa/plavix in setting of gi bleed  cards consulted  stent was placed 4-5 yrs ago as per pt     afib  hold AC in setting of bleed  rate control with sotalol      Advanced care planning was discussed with patient and family.  Advanced care planning forms were reviewed and discussed as appropriate.  Differential diagnosis and plan of care discussed with patient after the evaluation.   Pain assessed and judicious use of narcotics when appropriate was discussed.  Importance of Fall prevention discussed.  Counseling on Smoking and Alcohol cessation was offered when appropriate.  Counseling on Diet, exercise, and medication compliance was done.       Approx 60 minutes spent.
72 male h/o cad s/p pci, here with bloody diarrhea    bloody diarrhea  pancolitis on CT  gi consulted  cipro/flagyl  monitor qtc  stool pcr neg  active type and screen  serial cbc    cad s/p pci  hold asa/plavix in setting of gi bleed  cards consulted  stent was placed 4-5 yrs ago as per pt     afib  hold AC in setting of bleed  rate control with sotalol      Advanced care planning was discussed with patient and family.  Advanced care planning forms were reviewed and discussed as appropriate.  Differential diagnosis and plan of care discussed with patient after the evaluation.   Pain assessed and judicious use of narcotics when appropriate was discussed.  Importance of Fall prevention discussed.  Counseling on Smoking and Alcohol cessation was offered when appropriate.  Counseling on Diet, exercise, and medication compliance was done.       Approx 60 minutes spent.
72 male h/o cad s/p pci, here with bloody diarrhea    bloody diarrhea  pancolitis on CT  gi consultf /u  monitor qtc  stool pcr neg  cdiff positive  now on oral vanco  active type and screen  serial cbc    cad s/p pci  hold asa/plavix in setting of gi bleed  cards consulted  stent was placed 4-5 yrs ago as per pt     afib  hold AC in setting of bleed  rate control with metoprolol as per cards      Advanced care planning was discussed with patient and family.  Advanced care planning forms were reviewed and discussed as appropriate.  Differential diagnosis and plan of care discussed with patient after the evaluation.   Pain assessed and judicious use of narcotics when appropriate was discussed.  Importance of Fall prevention discussed.  Counseling on Smoking and Alcohol cessation was offered when appropriate.  Counseling on Diet, exercise, and medication compliance was done.       Approx 60 minutes spent.
72 male h/o cad s/p pci, here with bloody diarrhea    bloody diarrhea  pancolitis on CT  gi consultf /u  monitor qtc  stool pcr neg  cdiff positive  now on oral vanco  active type and screen  serial cbc  diarrhea improved    acute blood loss anemia  2/2 gi bleed  now resolved     cad s/p pci  hold asa/plavix in setting of gi bleed  cards consulted  stent was placed 4-5 yrs ago as per pt     afib  resume xarelto  rate control with metoprolol as per cards    dc planning    Advanced care planning was discussed with patient and family.  Advanced care planning forms were reviewed and discussed as appropriate.  Differential diagnosis and plan of care discussed with patient after the evaluation.   Pain assessed and judicious use of narcotics when appropriate was discussed.  Importance of Fall prevention discussed.  Counseling on Smoking and Alcohol cessation was offered when appropriate.  Counseling on Diet, exercise, and medication compliance was done.       Approx 60 minutes spent.
diarrhea   colitis    CT a/p with pancolitis  GI PCR negative   c diff positive   start PO vanco  diet as tolerated  CRP slightly elevated  ANCA/ASCA negative  no GI objection to dc planning on PO vanco  d/w pt    I reviewed the overnight course of events on the unit, re-confirming the patient history. I discussed the care with the patient and their family  The plan of care was discussed with the physician assistant and modifications were made to the notation where appropriate.   Differential diagnosis and plan of care discussed with patient after the evaluation  35 minutes spent on total encounter of which more than fifty percent of the encounter was spent counseling and/or coordinating care by the attending physician.  Advanced care planning was discussed with patient and family.  Advanced care planning forms were reviewed and discussed.  Risks, benefits and alternatives of gastroenterologic procedures were discussed in detail and all questions were answered. 
diarrhea   colitis    CT a/p with pancolitis  unclear if infection vs inflammatory   GI PCR negative   Cipro/flagyl if no EKG abnormalities   CLD  advance diet as tolerated  check ESR, CRP and fecal calprotectin   check ANCA/ASCA  will follow   d/w pt in ER    I reviewed the overnight course of events on the unit, re-confirming the patient history. I discussed the care with the patient and their family  The plan of care was discussed with the physician assistant and modifications were made to the notation where appropriate.   Differential diagnosis and plan of care discussed with patient after the evaluation  35 minutes spent on total encounter of which more than fifty percent of the encounter was spent counseling and/or coordinating care by the attending physician.  Advanced care planning was discussed with patient and family.  Advanced care planning forms were reviewed and discussed.  Risks, benefits and alternatives of gastroenterologic procedures were discussed in detail and all questions were answered. 
diarrhea   colitis    CT a/p with pancolitis  unclear if infection vs inflammatory   GI PCR negative   cdiff positive   start PO vanco  diet as tolerated  CRP slightly elevated  chek fecal calprotectin    ANCA/ASCA negative  will follow   d/w pt    I reviewed the overnight course of events on the unit, re-confirming the patient history. I discussed the care with the patient and their family  The plan of care was discussed with the physician assistant and modifications were made to the notation where appropriate.   Differential diagnosis and plan of care discussed with patient after the evaluation  35 minutes spent on total encounter of which more than fifty percent of the encounter was spent counseling and/or coordinating care by the attending physician.  Advanced care planning was discussed with patient and family.  Advanced care planning forms were reviewed and discussed.  Risks, benefits and alternatives of gastroenterologic procedures were discussed in detail and all questions were answered. 
71 yo male h/o afib on xarelto,  cad s/p pci, here with c/o diarrhea x4 weeks, which has been bloody past 2 weeks.
73 yo male h/o afib on xarelto,  cad s/p pci, here with c/o diarrhea x4 weeks, which has been bloody past 2 weeks.
73 yo male h/o afib on xarelto,  cad s/p pci, here with c/o diarrhea x4 weeks, which has been bloody past 2 weeks.

## 2023-03-31 NOTE — PROGRESS NOTE ADULT - PROVIDER SPECIALTY LIST ADULT
Gastroenterology
Internal Medicine
Cardiology

## 2023-04-01 LAB
BAKER'S YEAST IGA QN IA: 7.9 UNITS — SIGNIFICANT CHANGE UP
BAKER'S YEAST IGA QN IA: NEGATIVE — SIGNIFICANT CHANGE UP
BAKER'S YEAST IGG QN IA: 16.8 UNITS — SIGNIFICANT CHANGE UP
BAKER'S YEAST IGG QN IA: NEGATIVE — SIGNIFICANT CHANGE UP
CALPROTECTIN STL-MCNT: 78 UG/G — SIGNIFICANT CHANGE UP (ref 0–120)

## 2023-04-02 LAB
AUTO DIFF PNL BLD: ABNORMAL
C-ANCA SER-ACNC: NEGATIVE — SIGNIFICANT CHANGE UP
P-ANCA SER-ACNC: NEGATIVE — SIGNIFICANT CHANGE UP

## 2023-10-11 RX ORDER — RIVAROXABAN 15 MG-20MG
1 KIT ORAL
Refills: 0 | DISCHARGE

## 2023-10-11 RX ORDER — SOTALOL HCL 120 MG
1 TABLET ORAL
Refills: 0 | DISCHARGE

## 2023-10-11 RX ORDER — ATORVASTATIN CALCIUM 80 MG/1
1 TABLET, FILM COATED ORAL
Refills: 0 | DISCHARGE

## 2023-10-11 RX ORDER — ASPIRIN/CALCIUM CARB/MAGNESIUM 324 MG
1 TABLET ORAL
Refills: 0 | DISCHARGE

## 2023-10-11 RX ORDER — CLOPIDOGREL BISULFATE 75 MG/1
1 TABLET, FILM COATED ORAL
Refills: 0 | DISCHARGE

## 2024-01-09 ENCOUNTER — EMERGENCY (EMERGENCY)
Facility: HOSPITAL | Age: 74
LOS: 1 days | Discharge: HOME | End: 2024-01-09
Attending: EMERGENCY MEDICINE
Payer: MEDICARE

## 2024-01-09 VITALS
DIASTOLIC BLOOD PRESSURE: 74 MMHG | RESPIRATION RATE: 16 BRPM | OXYGEN SATURATION: 98 % | TEMPERATURE: 98 F | HEART RATE: 88 BPM | SYSTOLIC BLOOD PRESSURE: 158 MMHG

## 2024-01-09 VITALS
HEIGHT: 72 IN | WEIGHT: 154.98 LBS | OXYGEN SATURATION: 96 % | HEART RATE: 95 BPM | DIASTOLIC BLOOD PRESSURE: 86 MMHG | TEMPERATURE: 99 F | SYSTOLIC BLOOD PRESSURE: 164 MMHG | RESPIRATION RATE: 18 BRPM

## 2024-01-09 PROBLEM — I25.10 ATHEROSCLEROTIC HEART DISEASE OF NATIVE CORONARY ARTERY WITHOUT ANGINA PECTORIS: Chronic | Status: ACTIVE | Noted: 2023-03-27

## 2024-01-09 PROCEDURE — 71046 X-RAY EXAM CHEST 2 VIEWS: CPT

## 2024-01-09 PROCEDURE — 99284 EMERGENCY DEPT VISIT MOD MDM: CPT

## 2024-01-09 PROCEDURE — 71046 X-RAY EXAM CHEST 2 VIEWS: CPT | Mod: 26

## 2024-01-09 PROCEDURE — 99283 EMERGENCY DEPT VISIT LOW MDM: CPT | Mod: 25

## 2024-01-09 NOTE — ED ADULT NURSE NOTE - NSFALLUNIVINTERV_ED_ALL_ED
Bed/Stretcher in lowest position, wheels locked, appropriate side rails in place/Call bell, personal items and telephone in reach/Instruct patient to call for assistance before getting out of bed/chair/stretcher/Non-slip footwear applied when patient is off stretcher/[place] to call system/Physically safe environment - no spills, clutter or unnecessary equipment/Purposeful proactive rounding/Room/bathroom lighting operational, light cord in reach Bed/Stretcher in lowest position, wheels locked, appropriate side rails in place/Call bell, personal items and telephone in reach/Instruct patient to call for assistance before getting out of bed/chair/stretcher/Non-slip footwear applied when patient is off stretcher/[place] to call system/Physically safe environment - no spills, clutter or unnecessary equipment/Purposeful proactive rounding/Room/bathroom lighting operational, light cord in reach

## 2024-01-09 NOTE — ED PROVIDER NOTE - NSFOLLOWUPINSTRUCTIONS_ED_ALL_ED_FT
Thank you for visiting our Emergency Department, it has been a pleasure taking part in your healthcare. Please follow up with your primary doctor within x48 hours.    Your discharge diagnosis is:    Return precautions to the Emergency Department include but are not limited to: unrelenting nausea, vomiting, fever, chills, chest pain, shortness of breath, dizziness, abdominal pain, worsening pain, syncope, blood in urine or stool, headache that doesn't resolve, numbness or tingling, loss of sensation, loss of motor function, or any other concerning symptoms. Thank you for visiting our Emergency Department, it has been a pleasure taking part in your healthcare. Please follow up with your primary doctor within x48 hours.    Your discharge diagnosis is: cough    Return precautions to the Emergency Department include but are not limited to: unrelenting nausea, vomiting, fever, chills, chest pain, shortness of breath, dizziness, abdominal pain, worsening pain, syncope, blood in urine or stool, headache that doesn't resolve, numbness or tingling, loss of sensation, loss of motor function, or any other concerning symptoms.    Stay well hydrated and drink plenty of water     The ER will call you with follow up with a pulmonologist

## 2024-01-09 NOTE — ED ADULT NURSE NOTE - OBJECTIVE STATEMENT
73y male, AAOx4, pt in ED states that " I have had a cough for two weeks now. I had the same issue last year as well.  My cough is productive with white yellow sputum.  I feel short of breath at times and have no chest pains.  I have not had a fever either in the last two weeks". pt denies chest pain, shortness of breath, abdominal pain, ind at baseline, placed in gown, side rails up for safety, bed in lowest position, call bell within reach, patient and family educated on plan of care, comfort and safety provided.

## 2024-01-09 NOTE — ED PROVIDER NOTE - PATIENT PORTAL LINK FT
You can access the FollowMyHealth Patient Portal offered by [facility] by registering at the following website: [email or web address]. By joining [employer]’s FollowMyHealth portal, you will also be able to view your health information using other applications (apps) compatible with our system. You can access the FollowMyHealth Patient Portal offered by [facility] by registering at the following website: [email or web address]. By joining [employer]’s FollowMyHealth portal, you will also be able to view your health information using other applications (apps) compatible with our system.

## 2024-01-09 NOTE — ED PROVIDER NOTE - PROGRESS NOTE DETAILS
Patient signed out to me by Dr. Rivas pending xray chest. Patient ambulatory in the ED, Xray chest negative for consolidation. Will discuss with patient regarding outpatient follow up, will provide pulm follow up and strict return precautions. RGUJRAL  ID [id number] Discussed discharge instructions with patient with treatment remedies including decongestants and cough medications with pulmonary follow up. Lungs CTA B/L. Discharge plan and strict return precautions. MACCLYDE  ID [id number] Discussed discharge instructions with patient with treatment remedies including decongestants and cough medications with pulmonary follow up. Lungs CTA B/L. Discharge plan and strict return precautions. MACCLYDE

## 2024-01-09 NOTE — ED PROVIDER NOTE - ATTENDING APP SHARED VISIT CONTRIBUTION OF CARE
HX: pt with cough 2wks, white sputum, cough meds not helping, here for eval. ROS otherwise negative.    PE: well appearing, nontoxic, no respiratory distress.  Neuro nonfocal.  Skin intact. Psych normal mood.  Lungs mostly clear, some transmitted upper airway sounds.    MDM: bronchitis, r/o pneumonia, chest xray, education about cough management, outpt f/u.

## 2024-01-09 NOTE — ED PROVIDER NOTE - RAPID ASSESSMENT
73yo male in blue 34L presents to the ER for evaluation of cough .  PT mostly Luxembourgish speaking and  #[id number] used to communicate,  awake alert oriented x3  states "I have had a cough for two weeks now. I had the same issue last year as well.  My cough is productive with white yellow sputum.  I feel short of breath at times and have no chest pains.  I have not had a fever either in the last two weeks".  Pt is in no apparent distress and does not appear toxic.  Lungs with scattered rhonchi cleared with cough. 71yo male in blue 34L presents to the ER for evaluation of cough .  PT mostly Yakut speaking and  #[id number] used to communicate,  awake alert oriented x3  states "I have had a cough for two weeks now. I had the same issue last year as well.  My cough is productive with white yellow sputum.  I feel short of breath at times and have no chest pains.  I have not had a fever either in the last two weeks".  Pt is in no apparent distress and does not appear toxic.  Lungs with scattered rhonchi cleared with cough.

## 2024-01-09 NOTE — ED PROVIDER NOTE - OBJECTIVE STATEMENT
73yo male in blue 34L presents to the ER for evaluation of cough .  PT mostly Croatian speaking and  #[id number] used to communicate,  awake alert oriented x3  states "I have had a cough for two weeks now. I had the same issue last year as well.  My cough is productive with white yellow sputum.  I feel short of breath at times and have no chest pains.  I have not had a fever either in the last two weeks".  Pt is in no apparent distress and does not appear toxic.  Lungs with scattered rhonchi cleared with cough. 71yo male in blue 34L presents to the ER for evaluation of cough .  PT mostly Serbian speaking and  #[id number] used to communicate,  awake alert oriented x3  states "I have had a cough for two weeks now. I had the same issue last year as well.  My cough is productive with white yellow sputum.  I feel short of breath at times and have no chest pains.  I have not had a fever either in the last two weeks".  Pt is in no apparent distress and does not appear toxic.  Lungs with scattered rhonchi cleared with cough.

## 2024-12-17 NOTE — ED ADULT NURSE NOTE - CAS TRG GEN SKIN COLOR
December 17, 2024     Patient: Alo Garcia   YOB: 1991   Date of Visit: 12/17/2024       To Whom it May Concern:    Alo Garcia was seen in my clinic on 12/17/2024 at 11:00 am.      Alo Garcia is currently under my care and has been previously diagnosed with Obstructive Sleep Apnea. The patient's sleep apnea will be controlled with  with CPAP therapy. The equipment has been ordered through a [employer] company and may take at least 4- 6 weeks for delivery. Once the patient receives the device he must use the CPAP at least 70% of the time, to meet compliance. Mr. Garcia is scheduled to see me as a follow up in Feb 2025 for compliance review. Clearance can not be granted until he is treated for FRITZ. This will be determined at the next follow up. Copies of his sleep report is attached for your review.       This report of this status has been requested by the patient for work purposes.   If you have any questions please contact us at [phone].    Sincerely,         Chayito Hyde CNP    Medical information is confidential and cannot be disclosed without the written consent of the patient or his representative.     Normal for race